# Patient Record
Sex: MALE | Race: WHITE
[De-identification: names, ages, dates, MRNs, and addresses within clinical notes are randomized per-mention and may not be internally consistent; named-entity substitution may affect disease eponyms.]

---

## 2017-06-23 ENCOUNTER — HOSPITAL ENCOUNTER (EMERGENCY)
Dept: HOSPITAL 80 - FED | Age: 67
Discharge: HOME | End: 2017-06-23
Payer: OTHER GOVERNMENT

## 2017-06-23 VITALS
TEMPERATURE: 97.9 F | HEART RATE: 75 BPM | OXYGEN SATURATION: 93 % | SYSTOLIC BLOOD PRESSURE: 125 MMHG | DIASTOLIC BLOOD PRESSURE: 80 MMHG | RESPIRATION RATE: 16 BRPM

## 2017-06-23 DIAGNOSIS — M54.9: Primary | ICD-10-CM

## 2017-06-23 DIAGNOSIS — Z86.73: ICD-10-CM

## 2017-06-23 DIAGNOSIS — F17.200: ICD-10-CM

## 2017-06-23 DIAGNOSIS — G89.29: ICD-10-CM

## 2017-06-23 NOTE — EDPHY
H & P


Stated Complaint: C/O chronic back pain, just D/C'ed from [facility] Seen by Provider: 06/23/17 13:20


HPI/ROS: 





CHIEF COMPLAINT: Chronic pain





HISTORY OF PRESENT ILLNESS: The patient is a 68 y/o male with a history of 

chronic pain complaining of ongoing back and right shoulder pain. He had spinal 

surgery in 1991 and two right shoulder operations, spinal stenosis, and 

arthritis that cause chronic pain. He was treating his pain with 10mg oxycodone 

and reports that he has been on methadone and has a history of alcohol abuse. 

He was recently discharged from the [facility] rehab facility for alcohol 

abuse. He reports he's been sober since then. He tried to set up an appointment 

at the Suboxone clinic, but said they were not taking new patients. He says he 

received 3 days of medications at discharge including Clonidine and Keppra, but 

he doesn't know what to do after his medications run out as he is unwilling to 

find a [facility] provider. He is having trouble sleeping. When I told him we do not 

prescribe narcotics for chronic pain, he states, "well, then this is a waste of 

time."





REVIEW OF SYSTEMS:


Constitutional: No fever, no chills


Eyes: No visual changes


ENT: No sore throat


Respiratory: No cough, no shortness of breath


Cardiac: No chest pain


Gastrointestinal: No nausea, no vomiting, no abdominal pain


Genitourinary: No hematuria, no dysuria


Musculoskeletal: No leg pain or swelling


Skin: No rash


Neurological: No headache, no numbness, no weakness


Psychiatric: No depression





- Personal History


Current Tetanus Diphtheria and Acellular Pertussis (TDAP): Yes





- Medical/Surgical History


PMH: 





PMH includes:


1. Chronic pain following spinal surgery in 1991 and right shoulder surgeries


2. PTSD


3. Depression


Hx Asthma: No


Hx Chronic Respiratory Disease: Yes


Hx Diabetes: No


Hx Cardiac Disease: No


Hx Renal Disease: No


Hx Cirrhosis: No


Hx Alcoholism: Yes


Hx HIV/AIDS: No


Hx Splenectomy or Spleen Trauma: No


Other PMH: ETOH and opiate abuse, chronic bacl and shoulder pain, PTSD, CVA, 

emphazema





- Social History


Smoking Status: Current every day smoker


Additional Social History: 





, uses [facility]. No current PCP. Recently discharged from [facility]





- Physical Exam


Exam: 





General Appearance: Alert, does not appear in time


Eyes: Pupils equal and round, no conjunctival pallor


ENT, Mouth: Mucous membranes moist


Neck: Normal inspection


Respiratory: Lungs are clear to auscultation


Cardiovascular: Regular rate and rhythm 


Gastrointestinal:  Abdomen is soft and non-tender 


Back: lumbar midline scar, no localized tenderness


Neurological:  A&O, nonfocal, normal gait


Skin:  Warm and dry


Extremities:  normal inspection


Psychiatric: Mood and affect normal


Constitutional: 


 Initial Vital Signs











Temperature (C)  36.6 C   06/23/17 13:09


 


Heart Rate  75   06/23/17 13:09


 


Respiratory Rate  16   06/23/17 13:09


 


Blood Pressure  125/80 H  06/23/17 13:09


 


O2 Sat (%)  93   06/23/17 13:09








 











O2 Delivery Mode               Room Air














Allergies/Adverse Reactions: 


 





No Known Allergies Allergy (Unverified 06/23/17 13:05)


 








Home Medications: 














 Medication  Instructions  Recorded


 


Buprenorphine HCl  06/23/17


 


Clonidine HCl  06/23/17


 


Famotidine  06/23/17


 


Levetiracetam ER  06/23/17


 


Melatonin  06/23/17


 


Pepcid  06/23/17


 


Zofran Odt  06/23/17














Medical Decision Making


ED Course/Re-evaluation: 





Discussed patient's situation with case management. They will work on trying to 

get patient into a Suboxone clinic. 





1408: Patient eloped from the ED by the time case management went to talk with 

him. 





Departure





- Departure


Disposition: Home, Routine, Self-Care


Clinical Impression: 


 Chronic pain





Condition: Good


Instructions:  Chronic Pain (ED)


Additional Instructions: 


Follow up with the resources provided by case management. 





Please be aware the Emergency Department does not prescribe narcotics for 

chronic pain or Suboxone for opiate dependency. 


Referrals: 


NONE *PRIMARY CARE P,. [Primary Care Provider] - As per Instructions


Report Scribed for: Trista Vaz


Report Scribed by: Jaqueline Calvillo


Date of Report: 06/23/17


Time of Report: 13:26


Physician Review and Approval Statement: 





06/23/17 13:26


Portions of this note were transcribed by a medical scribe.  I personally 

performed a history, physical exam, medical decision making, and confirmed 

accuracy of information the transcribed note.

## 2017-07-17 ENCOUNTER — HOSPITAL ENCOUNTER (EMERGENCY)
Dept: HOSPITAL 80 - FED | Age: 67
Discharge: HOME | End: 2017-07-17
Payer: OTHER GOVERNMENT

## 2017-07-17 VITALS
SYSTOLIC BLOOD PRESSURE: 135 MMHG | OXYGEN SATURATION: 92 % | DIASTOLIC BLOOD PRESSURE: 84 MMHG | RESPIRATION RATE: 16 BRPM | HEART RATE: 110 BPM

## 2017-07-17 VITALS — TEMPERATURE: 98.6 F

## 2017-07-17 DIAGNOSIS — F17.200: ICD-10-CM

## 2017-07-17 DIAGNOSIS — G89.29: ICD-10-CM

## 2017-07-17 DIAGNOSIS — Z86.73: ICD-10-CM

## 2017-07-17 DIAGNOSIS — F10.220: ICD-10-CM

## 2017-07-17 DIAGNOSIS — M54.5: Primary | ICD-10-CM

## 2017-07-17 NOTE — EDPHY
H & P


Stated Complaint: DETOX


HPI/ROS: 





CHIEF COMPLAINT:  Acute alcohol intoxication, back pain





HISTORY OF PRESENT ILLNESS:  Patient complains of acute on chronic alcohol use, 

chronic back pain.  He is primarily here because his friend was concerned about 

his level of alcohol intake.  He took him to a detox facility, but they sent 

him here for Librium.  He says that he drinks heavily, usually vodka.  Varying 

volume per day.  He has done this for many years.  He this time he does not 

want to.  He does want to avoid withdrawals.  He also complains of chronic back 

pain.  This is lumbar.  It has been present for years.  It has been worsening 

over the past 2 weeks.  He has occasional pain that radiates down the left leg.

  He has no saddle anesthesia.  No numbness or tingling.  No weakness of the 

lower extremities. No trauma or new injuries.  He has not seen a surgeon in 

many years for this.  No other associated complaints or modifying factors.





REVIEW OF SYSTEMS:


Ten systems reviewed and are negative unless otherwise noted in the HPI





PAST MEDICAL HISTORY:  Chronic back pain and alcohol abuse





SOCIAL HISTORY:  Chronic alcohol use.  [place].





FAMILY HISTORY:  Noncontributory





EXAMINATION


General Appearance:  Alert, no distress.  Normal conversation


Head: normocephalic, atraumatic


Eyes:  Pupils equal and round, no conjunctival pallor or injection


ENT, Mouth:  Mucous membranes moist


Neck:  Normal inspection, supple, non-tender


Respiratory:  Lungs are clear to auscultation. No wheezing, rhonchi or crackles


Cardiovascular:  Tachycardic rate.  Regular rhythm.  No murmur


Gastrointestinal:  Abdomen is soft and nontender


Back:  Tenderness of the lumbar musculature.  No bony tenderness, crepitus, step

-off or deformity.  There is a well-healed surgical Incision of the lumbar 

spine.  


Neurological:  GCS 15.  Cranial nerves 2-12 grossly intact. Patellar reflexes 

symmetric at 2+.  Strength of the ankles, knees and hips 5/5 in both 

extremities.  A&O, nonfocal, normal gait


Skin:  Warm and dry, no rash


Extremities:  Nontender, no pedal edema


Psychiatric:  Mood and affect normal





DIFFERENTIAL DIAGNOSES:


Including but not limited to chronic alcohol abuse, acute alcohol intoxication, 

chronic low back pain, lumbar radiculopathy, discopathy, muscular strain, 

chronic pain





MDM:


2:00 p.m.


Chronic back pain and chronic alcoholism.  The patient is here because he wants 

to go to the [facility] for detox.  The back pain does not reveal any evidence of 

acute cord compression or cauda equina.  He is fully neuro intact and 

ambulatory without assistance or signs of weakness.  Recommend follow-up with 

spinal surgeon and/or pain management specialist for ongoing pain management.  

He is comfortable with that plan.  He is discharged home stable condition with 

a Librium taper per protocol.





SUPERVISION: 


This patient was independently evaluated without direct examination by the 

attending physician. Case was discussed with attending physician.


Source: Patient


Exam Limitations: No limitations





- Personal History


Current Tetanus/Diphtheria Vaccine: Yes





- Medical/Surgical History


Hx Asthma: No


Hx Chronic Respiratory Disease: Yes


Hx Diabetes: No


Hx Cardiac Disease: No


Hx Renal Disease: No


Hx Cirrhosis: No


Hx Alcoholism: Yes


Hx HIV/AIDS: No


Hx Splenectomy or Spleen Trauma: No


Other PMH: ETOH and opiate abuse, chronic bacl and shoulder pain, PTSD, CVA, 

emphazema





- Social History


Smoking Status: Current every day smoker


Constitutional: 


 Initial Vital Signs











Temperature (C)  98.6 F   07/17/17 12:53


 


Heart Rate  126 H  07/17/17 12:53


 


Respiratory Rate  18   07/17/17 12:53


 


Blood Pressure  133/87 H  07/17/17 12:53


 


O2 Sat (%)  94   07/17/17 12:53








 











O2 Delivery Mode               Room Air














Allergies/Adverse Reactions: 


 





No Known Allergies Allergy (Unverified 06/23/17 13:05)


 








Home Medications: 














 Medication  Instructions  Recorded


 


Buprenorphine HCl  06/23/17


 


Clonidine HCl  06/23/17


 


Famotidine  06/23/17


 


Levetiracetam ER  06/23/17


 


Melatonin  06/23/17


 


Pepcid  06/23/17


 


Zofran Odt  06/23/17














Medical Decision Making





- Data Points


Medications Given: 


 








Discontinued Medications





Chlordiazepoxide (Librium 25 Mg Prepack#6)  1 btl TAKEHOME EDNOW ONE


   Stop: 07/17/17 14:08


   Last Admin: 07/17/17 14:18 Dose:  1 btl








Departure





- Departure


Disposition: Home, Routine, Self-Care


Clinical Impression: 


Alcohol dependence


Qualifiers:


 Substance use status: with intoxication Complication of substance-induced 

condition: uncomplicated Qualified Code(s): F10.220 - Alcohol dependence with 

intoxication, uncomplicated





Low back pain


Qualifiers:


 Chronicity: chronic Back pain laterality: midline Sciatica presence: without 

sciatica Qualified Code(s): M54.5 - Low back pain





Condition: Good


Instructions:  Chlordiazepoxide (By mouth), Low Back Strain (ED), Alcohol 

Intoxication (ED), Abuse of Alcohol (ED), Chronic Back Pain (ED)


Additional Instructions: 


1. Follow up with a [facility] for detox assistance


2. Follow up with primary care physician for further care


3. Follow up with Neurosurgery for ongoing management of the back pain


4. Return to ER for worsening pain, numbness, tingling, weakness, incontinence 

of bowel or bladder


Referrals: 


NONE *PRIMARY CARE P,. [Primary Care Provider] - As per Instructions


GAURANG Gloria MD [Medical Doctor] - As per Instructions


Redd Horvath MD [Medical Doctor] - As per Instructions

## 2017-09-16 ENCOUNTER — HOSPITAL ENCOUNTER (INPATIENT)
Dept: HOSPITAL 80 - FED | Age: 67
LOS: 8 days | Discharge: HOME | DRG: 897 | End: 2017-09-24
Attending: INTERNAL MEDICINE | Admitting: INTERNAL MEDICINE
Payer: OTHER GOVERNMENT

## 2017-09-16 DIAGNOSIS — D64.89: ICD-10-CM

## 2017-09-16 DIAGNOSIS — M54.9: ICD-10-CM

## 2017-09-16 DIAGNOSIS — F10.231: Primary | ICD-10-CM

## 2017-09-16 DIAGNOSIS — F43.12: ICD-10-CM

## 2017-09-16 DIAGNOSIS — E87.2: ICD-10-CM

## 2017-09-16 DIAGNOSIS — E83.42: ICD-10-CM

## 2017-09-16 DIAGNOSIS — G89.29: ICD-10-CM

## 2017-09-16 DIAGNOSIS — F17.210: ICD-10-CM

## 2017-09-16 DIAGNOSIS — J43.9: ICD-10-CM

## 2017-09-16 DIAGNOSIS — Z23: ICD-10-CM

## 2017-09-16 DIAGNOSIS — F10.221: ICD-10-CM
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
## 2017-09-16 DIAGNOSIS — M25.511: ICD-10-CM

## 2017-09-16 DIAGNOSIS — E83.30: ICD-10-CM

## 2017-09-16 DIAGNOSIS — K70.10: ICD-10-CM

## 2017-09-16 DIAGNOSIS — Z86.73: ICD-10-CM

## 2017-09-16 DIAGNOSIS — D69.59: ICD-10-CM

## 2017-09-16 DIAGNOSIS — E87.1: ICD-10-CM

## 2017-09-16 LAB
% IMMATURE GRANULYOCYTES: 0.3 % (ref 0–1.1)
ABSOLUTE IMMATURE GRANULOCYTES: 0.02 10^3/UL (ref 0–0.1)
ABSOLUTE NRBC COUNT: 0 10^3/UL (ref 0–0.01)
ADD DIFF?: NO
ADD MORPH?: NO
ADD SCAN?: NO
ANION GAP SERPL CALC-SCNC: 20 MEQ/L (ref 8–16)
ATYPICAL LYMPHOCYTE FLAG: 0 (ref 0–99)
CALCIUM SERPL-MCNC: 9.2 MG/DL (ref 8.5–10.4)
CHLORIDE SERPL-SCNC: 98 MEQ/L (ref 97–110)
CO2 SERPL-SCNC: 19 MEQ/L (ref 22–31)
CREAT SERPL-MCNC: 0.6 MG/DL (ref 0.7–1.3)
ERYTHROCYTE [DISTWIDTH] IN BLOOD BY AUTOMATED COUNT: 18 % (ref 11.5–15.2)
ETHANOL SERPL-MCNC: 76 MG/DL (ref 0–10)
FRAGMENT RBC FLAG: 0 (ref 0–99)
GFR SERPL CREATININE-BSD FRML MDRD: > 60 ML/MIN/{1.73_M2}
GLUCOSE SERPL-MCNC: 139 MG/DL (ref 70–100)
HCT VFR BLD CALC: 38.5 % (ref 40–51)
HGB BLD-MCNC: 13.3 G/DL (ref 13.7–17.5)
LEFT SHIFT FLG: 0 (ref 0–99)
LIPEMIA HEMOLYSIS FLAG: 90 (ref 0–99)
MAGNESIUM SERPL-MCNC: 1.6 MG/DL (ref 1.6–2.3)
MCH RBC BLDCO QN: 31.4 PG (ref 27.9–34.1)
MCHC RBC AUTO-ENTMCNC: 34.5 G/DL (ref 32.4–36.7)
MCV RBC AUTO: 91 FL (ref 81.5–99.8)
NRBC-AUTO%: 0 % (ref 0–0.2)
PLATELET # BLD: 135 10^3/UL (ref 150–400)
PLATELET CLUMPS FLAG: 0 (ref 0–99)
PMV BLD AUTO: 10.2 FL (ref 8.7–11.7)
POTASSIUM SERPL-SCNC: 4.3 MEQ/L (ref 3.5–5.2)
RBC # BLD AUTO: 4.23 10^6/UL (ref 4.4–6.38)
SODIUM SERPL-SCNC: 137 MEQ/L (ref 134–144)

## 2017-09-16 PROCEDURE — C1751 CATH, INF, PER/CENT/MIDLINE: HCPCS

## 2017-09-16 PROCEDURE — G0480 DRUG TEST DEF 1-7 CLASSES: HCPCS

## 2017-09-16 PROCEDURE — HZ2ZZZZ DETOXIFICATION SERVICES FOR SUBSTANCE ABUSE TREATMENT: ICD-10-PCS | Performed by: INTERNAL MEDICINE

## 2017-09-16 PROCEDURE — G0008 ADMIN INFLUENZA VIRUS VAC: HCPCS

## 2017-09-16 PROCEDURE — C1769 GUIDE WIRE: HCPCS

## 2017-09-16 NOTE — EDPHY
General





- History


Smoking Status: Current every day smoker


Narrative: 





CHIEF COMPLAINT: 


Alcohol intoxication, request for clearance to the [facility]





HISTORY OF PRESENT ILLNESS: 


Patient presents with request to go to detox for alcohol abuse.  He admits to 1 

L per day of clear liquor ingestion.  This is on a daily basis.  This has been 

for years.  He called 911 because he wants to get off of alcohol on detox.  

[facility], and he was sent here for 

medical clearance.  He has no chest pain.  He has no shortness of breath.  Has 

no thoughts of self-harm or harm towards others.  He is here voluntarily and 

wants to detox.  He has had alcohol within the past 12 hours.  He says that he 

is an early symptoms of withdrawal but that he is no where near delirium 

tremens as he has experienced this in the past.  He has no other associated 

complaints or modifying factors for this.  Does have ongoing right shoulder 

pain that is chronic and without any new injury or severity.





REVIEW OF SYSTEMS:


Ten systems reviewed and are negative unless otherwise noted in the HPI








PAST MEDICAL HISTORY: 


Right chronic shoulder pain.  Alcoholism





PAST SURGICAL HISTORY:


Reviewed





SOCIAL HISTORY:


Nonsmoker.  Retired from the  with [facility] at the [facility]





FAMILY HISTORY:


Noncontributory





EXAMINATION


General Appearance:  Alert, no distress


Head: normocephalic, atraumatic


Eyes:  Pupils equal and round, no conjunctival pallor or injection


ENT, Mouth:  Mucous membranes moist


Neck:  Normal inspection, supple, non-tender


Respiratory:  Lungs are clear to auscultation


Cardiovascular:  Regular rate and rhythm


Gastrointestinal:  Abdomen is soft and nontender


Back: non-tender, no bony abnormalities


Neurological:  GCS 15. Non tremulous.  A&O, nonfocal, normal gait.  Strength is 

symmetric in all 4 limbs.  No pronator drift.  No dysmetria.  No evidence of 

encephalopathy


Skin:  Warm and dry, no rash


Extremities:  Nontender, no pedal edema


Psychiatric:  Admits to chronic alcoholism.  Normal mood and affect.  No 

suicidal ideation.  No homicidal ideation.





DIFFERENTIAL DIAGNOSES:


Including but not limited to acute alcohol intoxication, DTs, withdrawals, 

chronic alcoholism








MDM:


7:02 p.m.


Acute alcohol intoxication the patient with chronic alcohol abuse.  He has no 

evidence of delirium tremens.  He does appear to be an early withdrawals.  He 

has no fever.  He is not encephalopathic.  He has had alcohol within the past 12

-14 hours.  He is voluntarily urine wants to go to the [facility].  Although some here 

with Librium incident with a Librium taper per protocol.  He is fully 

ambulatory without difficulty.  He is not slurring his words.  He is clinically 

sober and I do feel he is stable for discharge to the [facility].








8:40 p.m.


Notified by RN that the patient was attempted to ambulate.  This was 

unsuccessful.  He was reportedly unsteady.  I then examined him.  He was 

unsteady on his feet.  Secured he is in the room and obtained a breath alcohol 

test.  This registered at 0.073.  I have ordered IV fluid , laboratory studies, 

CT head and I will monitor closely.  Patient already had thiamine and folate 

when he arrived by p.o. intake.





9:55 p.m.


Notified by radiologist Dr. Valdovinos.  No acute findings on the CT scan of the 

head.  Does have appearance consistent with clinical history of chronic alcohol 

abuse





10:10 p.m.


Patient re-evaluated.  Patient's symptoms do well with the Ativan began to 

return.  He does appear to be an early delirium tremens or late alcohol 

withdrawals.  No delirium yet.  No encephalopathy.  I will admit him for 

treatment and prevention of DTs.





10:20 p.m.


Case discussed with Dr. Pam Walker.  She will admit the patient to her 

service.  She recommends Valium for further treatment.  Admit to step-down 

unit.  He is admitted in stable condition.


 (Christian Lacy)


Medical Decision Making: 





I have evaluated and participated in the management of this patient.  My co-

signature indicates that I have reviewed this chart and that I agree with the 

findings and the plan of care as documented.  My personal history and physical 

findings include:  This is a 67-year-old male with a long history of alcohol 

abuse, drinking a L of vodka daily.  He has not had anything to drink for 

several hours now and is beginning to experience symptoms of withdrawal.  He 

was referred from the [facility] because of a high CIWA score.  The time of my 

evaluation he is hypertensive, tachycardic, and has a mild tremor.  He is not 

confused.  Normal extraocular movements.  He is quite unsteady on his feet, 

essentially unable to walk on his own.  He does have a history of stroke with a 

foot drop but is ataxic at the time of my evaluation.  I recommend admission 

for treatment of alcohol withdrawal.  He has received thiamine.  He has been 

given Ativan. (Annette Rios)





- Objective


Vital Signs: 


 Initial Vital Signs











Temperature (C)  37.4 C   09/16/17 18:22


 


Heart Rate  124 H  09/16/17 18:22


 


Respiratory Rate  20   09/16/17 18:22


 


Blood Pressure  133/104 H  09/16/17 18:22


 


O2 Sat (%)  92   09/16/17 18:22








 











O2 Delivery Mode               Nasal Cannula


 


O2 (L/minute)                  2














Allergies/Adverse Reactions: 


 





No Known Allergies Allergy (Verified 09/16/17 18:22)


 








Home Medications: 














 Medication  Instructions  Recorded


 


Methadone HCl [Methadone 5 mg (*)] 5 mg PO BID 09/17/17


 


oxyCODONE IR [Oxycodone Ir (*)] 5 mg PO BID 09/17/17











Laboratory Results: 


 Laboratory Results





 09/16/17 21:21 





 09/16/17 21:21 








Medications Given: 


 





Enoxaparin Sodium (Lovenox)  40 mg SC DAILY MELISSA


   Stop: 03/16/18 08:59


   Last Admin: 09/17/17 09:49 Dose:  40 mg


Folic Acid (Folic Acid)  1 mg PO DAILY MELISSA


   Stop: 03/16/18 08:59


   Last Admin: 09/17/17 09:49 Dose:  1 mg


Sodium Chloride (Ns)  1,000 mls @ 100 mls/hr IV CONT MELISSA


   Stop: 03/15/18 22:44


   Last Admin: 09/17/17 11:26 Dose:  1,000 mls


Dexmedetomidine HCl 400 mcg/ (Sodium Chloride)  104 mls @ 0 mls/hr IV CONT MELISSA; 

Titrate


   PRN Reason: Protocol


   Stop: 03/16/18 13:29


   Last Admin: 09/17/17 19:30 Dose:  104 mls


Lorazepam (Ativan Injection)  2 mg IVP Q6 MELISSA


   Stop: 03/16/18 17:59


   Last Admin: 09/17/17 21:47 Dose:  2 mg


Methadone HCl (Methadone Hcl)  5 mg PO BID MELISSA


   Stop: 09/27/17 20:59


   Last Admin: 09/17/17 21:47 Dose:  5 mg


Multivitamins (Tab-A-Leora)  1 each PO DAILY MELISSA


   Stop: 03/16/18 08:59


   Last Admin: 09/17/17 09:49 Dose:  1 each


Oxycodone HCl (Oxycodone Ir)  5 mg PO BID MELISSA


   Stop: 09/27/17 20:59


   Last Admin: 09/17/17 21:47 Dose:  5 mg


Thiamine HCl (Vitamin B-1)  100 mg PO DAILY MELISSA


   Stop: 03/16/18 08:59


   Last Admin: 09/17/17 09:49 Dose:  100 mg





Discontinued Medications





Chlordiazepoxide (Librium 25 Mg Prepack#6)  1 btl TAKEHOME EDNOW ONE


   Stop: 09/16/17 19:02


   Last Admin: 09/16/17 19:44 Dose:  1 btl


Chlordiazepoxide HCl (Librium)  25 mg PO EDNOW ONE


   Stop: 09/16/17 19:02


   Last Admin: 09/16/17 19:43 Dose:  25 mg


Folic Acid (Folic Acid)  1 mg PO EDNOW ONE


   Stop: 09/16/17 19:03


   Last Admin: 09/16/17 19:44 Dose:  1 mg


Sodium Chloride (Ns)  1,000 mls @ 0 mls/hr IV EDNOW ONE; Wide Open


   PRN Reason: Protocol


   Stop: 09/16/17 20:46


   Last Admin: 09/16/17 21:29 Dose:  1,000 mls


Potassium Phosphate 10 mmol/ (Dextrose)  253.3333 mls @ 42.222 mls/hr IV ONCE@

12 ONE


   Stop: 09/17/17 06:29


   Last Admin: 09/17/17 02:08 Dose:  253.3333 mls


Magnesium Sulfate/Dextrose (Magnesium Sulf 1 Gm (Premix))  100 mls @ 100 mls/hr 

IV ONCE ONE


   Stop: 09/17/17 01:31


   Last Admin: 09/17/17 00:53 Dose:  100 mls


Influenza Virus Vaccine Quadrival (Fluarix Quad [id number])  0.5 ml IM .ONCE ONE


   Stop: 09/17/17 01:09


   Last Admin: 09/17/17 01:20 Dose:  0.5 ml


Lorazepam (Ativan Injection)  1 mg IVP EDNOW ONE


   Stop: 09/16/17 21:10


   Last Admin: 09/16/17 21:27 Dose:  1 mg


Lorazepam (Ativan Injection)  1 mg IVP EDNOW ONE


   Stop: 09/16/17 21:47


   Last Admin: 09/16/17 21:48 Dose:  1 mg


Lorazepam (Ativan Injection)  1 mg IVP EDNOW ONE


   Stop: 09/16/17 22:19


   Last Admin: 09/16/17 23:04 Dose:  1 mg


Lorazepam (Ativan Injection)  0.5 - 3 mg IVP Q30M PRN


   PRN Reason: CIWA-Ar score greater than 15


   Stop: 03/15/18 22:30


   Last Admin: 09/17/17 12:34 Dose:  3 mg


Lorazepam (Ativan Injection)  0.5 - 3 mg IVP ONCE ONE


   Stop: 09/17/17 13:31


   Last Admin: 09/17/17 13:39 Dose:  Not Given


Thiamine HCl (Vitamin B-1)  100 mg PO EDNOW ONE


   Stop: 09/16/17 19:03


   Last Admin: 09/16/17 19:44 Dose:  100 mg








Departure





- Departure


Disposition: [facility]


Clinical Impression: 


 Alcoholism





Acute alcohol intoxication


Qualifiers:


 Complication of substance-induced condition: uncomplicated Qualified Code(s): 

F10.929 - Alcohol use, unspecified with intoxication, unspecified





Alcohol withdrawal


Qualifiers:


 Complication of substance-induced condition: with unspecified complication 

Qualified Code(s): F10.239 - Alcohol dependence with withdrawal, unspecified





Condition: Good

## 2017-09-17 LAB
% IMMATURE GRANULYOCYTES: 0.4 % (ref 0–1.1)
ABSOLUTE IMMATURE GRANULOCYTES: 0.02 10^3/UL (ref 0–0.1)
ABSOLUTE NRBC COUNT: 0 10^3/UL (ref 0–0.01)
ADD DIFF?: NO
ADD MORPH?: NO
ADD SCAN?: NO
ALBUMIN SERPL-MCNC: 4.1 G/DL (ref 3.5–5)
ALP SERPL-CCNC: 93 IU/L (ref 38–126)
ALT SERPL-CCNC: 65 IU/L (ref 21–72)
ANION GAP SERPL CALC-SCNC: 10 MEQ/L (ref 8–16)
AST SERPL-CCNC: 109 IU/L (ref 17–59)
ATYPICAL LYMPHOCYTE FLAG: 10 (ref 0–99)
BILIRUB SERPL-MCNC: 2.4 MG/DL (ref 0.1–1.4)
BILIRUBIN-CONJUGATED: 0.8 MG/DL (ref 0–0.5)
BILIRUBIN-UNCONJUGATED: 1.6 MG/DL (ref 0–1.1)
CALCIUM SERPL-MCNC: 9 MG/DL (ref 8.5–10.4)
CHLORIDE SERPL-SCNC: 98 MEQ/L (ref 97–110)
CO2 SERPL-SCNC: 26 MEQ/L (ref 22–31)
CREAT SERPL-MCNC: 0.5 MG/DL (ref 0.7–1.3)
ERYTHROCYTE [DISTWIDTH] IN BLOOD BY AUTOMATED COUNT: 18.2 % (ref 11.5–15.2)
FRAGMENT RBC FLAG: 0 (ref 0–99)
GFR SERPL CREATININE-BSD FRML MDRD: > 60 ML/MIN/{1.73_M2}
GLUCOSE SERPL-MCNC: 106 MG/DL (ref 70–100)
HCT VFR BLD CALC: 39 % (ref 40–51)
HGB BLD-MCNC: 13.4 G/DL (ref 13.7–17.5)
LEFT SHIFT FLG: 0 (ref 0–99)
LIPEMIA HEMOLYSIS FLAG: 90 (ref 0–99)
MAGNESIUM SERPL-MCNC: 1.9 MG/DL (ref 1.6–2.3)
MCH RBC BLDCO QN: 31.6 PG (ref 27.9–34.1)
MCHC RBC AUTO-ENTMCNC: 34.4 G/DL (ref 32.4–36.7)
MCV RBC AUTO: 92 FL (ref 81.5–99.8)
NRBC-AUTO%: 0 % (ref 0–0.2)
PLATELET # BLD: 101 10^3/UL (ref 150–400)
PLATELET CLUMPS FLAG: 0 (ref 0–99)
PMV BLD AUTO: 10 FL (ref 8.7–11.7)
POTASSIUM SERPL-SCNC: 3.9 MEQ/L (ref 3.5–5.2)
PROT SERPL-MCNC: 7.2 G/DL (ref 6.3–8.2)
RBC # BLD AUTO: 4.24 10^6/UL (ref 4.4–6.38)
SODIUM SERPL-SCNC: 134 MEQ/L (ref 134–144)

## 2017-09-17 RX ADMIN — THERA TABS SCH EACH: TAB at 09:49

## 2017-09-17 RX ADMIN — DEXMEDETOMIDINE HYDROCHLORIDE SCH MLS: 100 INJECTION, SOLUTION, CONCENTRATE INTRAVENOUS at 13:39

## 2017-09-17 RX ADMIN — DEXMEDETOMIDINE HYDROCHLORIDE SCH MLS: 100 INJECTION, SOLUTION, CONCENTRATE INTRAVENOUS at 19:30

## 2017-09-17 RX ADMIN — OXYCODONE HYDROCHLORIDE SCH MG: 15 TABLET ORAL at 21:47

## 2017-09-17 RX ADMIN — METHADONE HYDROCHLORIDE SCH MG: 5 TABLET ORAL at 21:47

## 2017-09-17 RX ADMIN — ENOXAPARIN SODIUM SCH MG: 100 INJECTION SUBCUTANEOUS at 09:49

## 2017-09-17 RX ADMIN — SODIUM CHLORIDE SCH MLS: 900 INJECTION, SOLUTION INTRAVENOUS at 00:05

## 2017-09-17 RX ADMIN — Medication SCH MG: at 09:49

## 2017-09-17 RX ADMIN — SODIUM CHLORIDE SCH MLS: 900 INJECTION, SOLUTION INTRAVENOUS at 11:26

## 2017-09-17 RX ADMIN — FOLIC ACID SCH MG: 1 TABLET ORAL at 09:49

## 2017-09-17 NOTE — GHP
[f 
rep st]



                                                            HISTORY AND PHYSICAL





DATE OF ADMISSION:  09/16/2017



CHIEF COMPLAINT:  Alcohol withdrawal.



HISTORY OF PRESENT ILLNESS:  A 67-year-old male with history of alcohol abuse, 
withdrawal with seizure, presenting with request for detox.  He admits to 
drinking a L a day of Absolute vodka and does this daily.  He has been drinking 
this much for years.  He states he has [facility] through [facility] in 
the past.  [facility] 
complaining of shakes, nausea, hot flashes, and a dry cough that is unchanged.  
No nausea, vomiting.  No dysuria.



REVIEW OF SYSTEMS:  I completed a 10-point review of systems, negative except 
as noted in History of Present Illness.



PAST MEDICAL HISTORY:  Right shoulder pain.  

Chronic back pain.  Alcohol abuse with withdrawal and seizure, PTSD, CVA, 
emphysema.



SURGICAL HISTORY:  Back and shoulder surgery.



ALLERGIES:  No known drug allergies.



MEDICATIONS:  None.



SOCIAL HISTORY:  [place]ives here in town.  Has a daughter here in town.  Drinks a L 
of vodka daily, half a pack to a pack a day of cigarettes for 40 years.



ALLERGIES:  No known drug allergies.



HOME MEDICATIONS:  Zofran, Pepcid, melatonin, Keppra, famotidine, clonidine



PHYSICAL EXAM:  VITAL SIGNS:  Temperature 36.9, blood pressure 149/89, heart 
rate 1/teens, respirations 16, 95% on 2 L.  GENERAL:  Disheveled, lying in bed.
  HEENT:  PERRLA.  EOMI.  Poor dentition.  CV:  Tachy but regular.  No murmurs, 
gallops, rubs.  LUNGS:  Clear.  No crackles or wheezing.  ABDOMEN:  Soft.  Mild 
tenderness diffusely but no rebound or guarding.  Positive bowel sounds.  :  
No suprapubic tenderness.  MUSCULOSKELETAL:  5/5 upper and lower extremity 
strength.  

NEURO:  2 through 12 intact.  Positive tremors.  Positive tongue 
fasciculations.  Alert and oriented x3.



LABS:  WBC 7, hemoglobin is 13, hematocrit 38, platelets 135.  Sodium 137, 
potassium 4.3, chloride 98.  Anion gap is 20.  BUN is 12, creatinine 0.6.  
Glucose 139, phos 1.8, mag 1.6.  



Head CT:  Underlying cerebellar and cerebral atrophy without acute intracranial 
abnormality.



ASSESSMENT/PLAN:  

1.  Alcohol withdrawal:  The patient will be transferred to the ICU and placed 
on CIWA.  Multivitamins, folate, thiamine.

2.  Hypophosphatemia.  Replete.

3.  Alcohol abuse.  The patient wants to quit, although he has had several 
hospitalizations in the past.  Will have Case Management evaluate.

4.  Anion gap acidosis.  Suspect starvation and alcohol ketoacidosis.  Check a 
lactate, repeat in the morning and will hydrate.

5.  Alcohol abuse.  Alcohol level is high at 76.

6.  Hypophosphatemia, replete and place on protocol.

7.  Normocytic anemia, likely secondary to alcohol.

8.  Thrombocytopenia, again secondary to alcohol.  No active bleeding.

9.  Chronic pain.  Resume home medications.  Questionable seizure disorder? Has 
Keppra on med list.  Will have to after review with patient.  Will resume these 
medications.

10.  Diet:  Regular.

11.  Deep venous thrombosis prophylaxis:  Lovenox.



DISPOSITION:  Patient warrants inpatient admission given acute alcohol 
withdrawal, warranting IV Ativan and ICU monitoring.





Job #:  [id number]

MTDD

## 2017-09-17 NOTE — HOSPPROG
Hospitalist Progress Note


Assessment/Plan: 





# etOH abuse and w/d - concerned this will be severe


   - cont ativan per CIWA


   - thiamine/folate


# thrombocytopenia - check LFTs


# hx seizures - he says all etOH related; he is on keppra - will continue


# possible chronic pain - reconcile meds when available





Subjective: received ativan IV overnight; currently sleeping, drowsy


Objective: 


 Vital Signs











Temp Pulse Resp BP Pulse Ox


 


 37.1 C   95   24 H  136/86 H  94 


 


 09/17/17 08:00  09/17/17 10:00  09/17/17 10:00  09/17/17 10:00  09/17/17 10:00








 Laboratory Results





 09/17/17 05:00 





 09/17/17 05:00 





 











 09/16/17 09/17/17 09/18/17





 05:59 05:59 05:59


 


Intake Total  1978 


 


Output Total  1800 100


 


Balance  178 -100








chart reviewed


CT head reviewed





- Physical Exam


Constitutional: unkempt


Cardiovascular: regular rate and rhythym, no murmur, rub, or gallop


Respiratory: no respiratory distress, no rales or rhonchi, clear to auscultation


Gastrointestinal: normoactive bowel sounds, soft, non-tender abdomen, no 

palpable masses





ICD10 Worksheet


Patient Problems: 


 Problems











Problem Status Onset


 


Acute alcohol intoxication Acute  


 


Alcoholism Acute  


 


Alcohol withdrawal Acute

## 2017-09-17 NOTE — ASMTCMCOM
CM Note

 

CM Note                       

Notes:

68 yo male came to [facility] from the [facility], wants to detox and stop drinking. [facility] 

[facility]. Patient is unsteady and in withdrawal, but did ask for help with [facility] 

Directives. Case Management to follow with ETOH resources.

 

Date Signed:  09/17/2017 04:22 PM

Electronically Signed By:Sapphire Hall LCSW

## 2017-09-17 NOTE — GCON
[f rep st]



                                                                    CONSULTATION





PULMONARY/CRITICAL CARE CONSULTATION



DATE OF CONSULTATION:  09/17/2017



REFERRING PHYSICIAN:  Hua Hayes MD



REASON FOR REFERRAL:  Evaluation and management of agitation, alcohol withdrawal, and elevated biliru
bin.



HISTORY:  The patient is a 67-year-old male with a long history of alcohol abuse, who has attempted t
o stop drinking in the past.  He drinks about a liter of vodka a day for years.  He was brought to [facility] yesterday complaining of shakes, nausea, and hot flashes.  He was rowan
sferred here because of high CIWA score.  He has been intermittently sedated and agitated, throwing t
hings at the nurses.  He has just been started on Precedex and is now much more sedated.



PAST MEDICAL HISTORY:  

1.  Shoulder pain.

2.  Chronic back pain.  

3.  Alcohol abuse.

4.  PTSD.

5.  History of a CVA.

6.  Emphysema.



MEDICATIONS:  None.



ALLERGIES:  None.



SOCIAL HISTORY:  The patient lives in [place].  He drinks a liter of vodka daily and smokes half a pa
ck of cigarettes daily.



FAMILY HISTORY:  Unremarkable.



REVIEW OF SYSTEMS:  Unobtainable.



PHYSICAL EXAMINATION:  GENERAL:  The patient is sedated.  He is minimally responsive to noxious stimu
li.  VITAL SIGNS:  His blood pressure is 128/92, with a heart rate of 64.  He is afebrile.  Oxygen sa
turations are 99% on 2 L.  HEENT:  Normocephalic and atraumatic.  No icterus.  NECK:  No JVD.  Trache
a is midline.  CHEST:  Clear to auscultation.  CARDIAC:  Regular rate and rhythm without murmur.  ABD
OMEN:  Soft, nontender.  Bowel sounds are present.  EXTREMITIES:  No clubbing, cyanosis, or edema.  N
EURO:  The patient moves all extremities weakly to noxious stimuli.  Pupils are responsive and the ga
ze is conjugate.



LABORATORY DATA:  Chemistry group is remarkable for a total bilirubin of 2.4 with an unconjugated buck
irubin of 1.6.  Hemoglobin is 13.4, with an MCV of 92.  Venous lactate is 1.6.  Alcohol level of 76 a
t the time of admission.  A CT scan of the head shows atrophy with no acute findings.  Images reviewe
d.



ASSESSMENT:  

1.  Alcohol intoxication/withdrawal.  The patient has been showing signs of withdrawal while still ha
ving a measurable alcohol level, which is somewhat concerning for high risk of severe withdrawal and 
seizures.  He was having breakthrough symptoms of confusion and agitation despite intravenous Ativan.
  Precedex has been added and he is much more sedated.  

2.  Elevated bilirubin.  This could be due to cirrhosis.  The AST is mildly elevated, although the AL
T is normal.  It also could be Gilbert's.



RECOMMENDATIONS:  

1.  Reduce Precedex dose.  Continue benzodiazepines p.r.n. in addition to scheduled Ativan, which has
 now been started.  

2.  Follow liver function tests.





Job #:  [id number]

## 2017-09-18 LAB
% IMMATURE GRANULYOCYTES: 0.5 % (ref 0–1.1)
ABSOLUTE IMMATURE GRANULOCYTES: 0.03 10^3/UL (ref 0–0.1)
ABSOLUTE NRBC COUNT: 0 10^3/UL (ref 0–0.01)
ADD DIFF?: NO
ADD MORPH?: NO
ADD SCAN?: NO
ANION GAP SERPL CALC-SCNC: 14 MEQ/L (ref 8–16)
ATYPICAL LYMPHOCYTE FLAG: 10 (ref 0–99)
CALCIUM SERPL-MCNC: 9.4 MG/DL (ref 8.5–10.4)
CHLORIDE SERPL-SCNC: 99 MEQ/L (ref 97–110)
CO2 SERPL-SCNC: 22 MEQ/L (ref 22–31)
CREAT SERPL-MCNC: 0.5 MG/DL (ref 0.7–1.3)
ERYTHROCYTE [DISTWIDTH] IN BLOOD BY AUTOMATED COUNT: 17.3 % (ref 11.5–15.2)
FRAGMENT RBC FLAG: 0 (ref 0–99)
GFR SERPL CREATININE-BSD FRML MDRD: > 60 ML/MIN/{1.73_M2}
GLUCOSE SERPL-MCNC: 104 MG/DL (ref 70–100)
HCT VFR BLD CALC: 43.2 % (ref 40–51)
HGB BLD-MCNC: 14.8 G/DL (ref 13.7–17.5)
LEFT SHIFT FLG: 0 (ref 0–99)
LIPEMIA HEMOLYSIS FLAG: 90 (ref 0–99)
MAGNESIUM SERPL-MCNC: 1.5 MG/DL (ref 1.6–2.3)
MCH RBC BLDCO QN: 31.7 PG (ref 27.9–34.1)
MCHC RBC AUTO-ENTMCNC: 34.3 G/DL (ref 32.4–36.7)
MCV RBC AUTO: 92.5 FL (ref 81.5–99.8)
NRBC-AUTO%: 0 % (ref 0–0.2)
PLATELET # BLD: 92 10^3/UL (ref 150–400)
PLATELET CLUMPS FLAG: 0 (ref 0–99)
PMV BLD AUTO: 10.4 FL (ref 8.7–11.7)
POTASSIUM SERPL-SCNC: 3.7 MEQ/L (ref 3.5–5.2)
RBC # BLD AUTO: 4.67 10^6/UL (ref 4.4–6.38)
SODIUM SERPL-SCNC: 135 MEQ/L (ref 134–144)

## 2017-09-18 RX ADMIN — Medication SCH: at 14:07

## 2017-09-18 RX ADMIN — SODIUM CHLORIDE SCH MLS: 900 INJECTION, SOLUTION INTRAVENOUS at 21:23

## 2017-09-18 RX ADMIN — OXYCODONE HYDROCHLORIDE SCH: 15 TABLET ORAL at 21:20

## 2017-09-18 RX ADMIN — POTASSIUM CHLORIDE SCH MLS: 10 INJECTION, SOLUTION INTRAVENOUS at 21:18

## 2017-09-18 RX ADMIN — ENOXAPARIN SODIUM SCH: 100 INJECTION SUBCUTANEOUS at 14:06

## 2017-09-18 RX ADMIN — FAMOTIDINE SCH: 20 TABLET, FILM COATED ORAL at 21:21

## 2017-09-18 RX ADMIN — DEXMEDETOMIDINE HYDROCHLORIDE SCH MLS: 100 INJECTION, SOLUTION, CONCENTRATE INTRAVENOUS at 00:09

## 2017-09-18 RX ADMIN — SODIUM CHLORIDE SCH MLS: 900 INJECTION, SOLUTION INTRAVENOUS at 00:11

## 2017-09-18 RX ADMIN — METHADONE HYDROCHLORIDE SCH: 5 TABLET ORAL at 21:21

## 2017-09-18 RX ADMIN — METHADONE HYDROCHLORIDE SCH: 5 TABLET ORAL at 14:06

## 2017-09-18 RX ADMIN — OXYCODONE HYDROCHLORIDE SCH: 15 TABLET ORAL at 14:06

## 2017-09-18 RX ADMIN — FOLIC ACID SCH: 1 TABLET ORAL at 14:06

## 2017-09-18 RX ADMIN — THERA TABS SCH: TAB at 14:06

## 2017-09-18 RX ADMIN — DEXMEDETOMIDINE HYDROCHLORIDE SCH MLS: 100 INJECTION, SOLUTION, CONCENTRATE INTRAVENOUS at 20:40

## 2017-09-18 RX ADMIN — DEXMEDETOMIDINE HYDROCHLORIDE SCH MLS: 100 INJECTION, SOLUTION, CONCENTRATE INTRAVENOUS at 09:42

## 2017-09-18 RX ADMIN — DEXMEDETOMIDINE HYDROCHLORIDE SCH MLS: 100 INJECTION, SOLUTION, CONCENTRATE INTRAVENOUS at 16:57

## 2017-09-18 NOTE — HOSPPROG
Hospitalist Progress Note


Assessment/Plan: 





66 yo M with PMH of etoh abuse and w/d as well as prior w/d seizures presenting 

with etoh w/d





# etoh w/d: severe, requiring precedex gtt to manage agitation and aggression. 

Continue precedex and scheduled ativan along with mvi/thiamine/folate. Will 

need longer term treatment plan when acute detox resolved if he is amenable. 


# etoh abuse: as above, longstanding issue, he is eager to quit and may [facility] 

[facility]


# AGMA: secondary to alcoholic ketoacidosis and resolved with IVF


# alcoholic hepatitis: mild with only mildly increased bili and ast


# hypophosphatemia/hypomagnesemia: repleting per electrolyte protocol, improving


# anemia/thrombocytopenia: likely related to bone marrow suppression from heavy 

etoh abuse, trending


# dispo: IP status, remains in ICU on precedex gtt, high risk given need for 

precedex drip


Patient new to my care. Old records reviewed and summarized as above. 





Subjective: patient still requiring precedex, agitated and agressive when not 

sedated


Objective: 


 Vital Signs











Temp Pulse Resp BP Pulse Ox


 


 36.9 C   58 L  20   143/82 H  96 


 


 09/18/17 07:57  09/18/17 10:00  09/18/17 10:00  09/18/17 10:00  09/18/17 10:00








 Laboratory Results





 09/18/17 10:20 





 09/18/17 10:20 





 











 09/17/17 09/18/17 09/19/17





 05:59 05:59 05:59


 


Intake Total 4245 2910 


 


Output Total 1800 1605 900


 


Balance 178 1305 -900








alternating between somnolent and agitated/aggressive 


rrr no mrg


cta to ant exam


soft nt nd


no cce


aggressive/agitated





ICD10 Worksheet


Patient Problems: 


 Problems











Problem Status Onset


 


Acute alcohol intoxication Acute  


 


Alcohol withdrawal Acute  


 


Alcoholism Acute

## 2017-09-18 NOTE — PDINTPN
Intensivist Progress Note


Assessment/Plan: 


Assessment:





Chronic alcohol abuse 





Alcohol withdrawal, DTs.  On the CIWA protocol.  On Precedex as well as 

scheduled and as needed Ativan.





History of tobacco tobacco abuse.  Chronic bronchitis





Metabolic:  No issues identified





DVT prophylaxis:  On enoxaparin.





GI prophylaxis:  Will add Pepcid orally.








Plan:  Continue care in the intensive care unit.  Continue Ativan scheduled and 

p.r.n..  Continue thiamin and CIWA protocol.  Decreased Precedex as possible.  

Add a nicotine patch.  Follow laboratory.  Continue intravenous fluids.  See 

orders.








Subjective: 





Sedated, arouses, can be combative/confused at times.


Objective: 





 Vital Signs











Temp Pulse Resp BP Pulse Ox


 


 36.9 C   58 L  20   143/82 H  96 


 


 09/18/17 07:57  09/18/17 10:00  09/18/17 10:00  09/18/17 10:00  09/18/17 10:00








 Laboratory Results





 09/18/17 10:20 





 09/18/17 10:20 





 











 09/17/17 09/18/17 09/19/17





 05:59 05:59 05:59


 


Intake Total 1978 2910 


 


Output Total 1800 1605 900


 


Balance 178 1305 -900











 Laboratory Tests











  09/17/17 09/17/17 09/18/17





  01:05 12:35 10:20


 


VBG Lactic Acid  1.9  


 


Magnesium    1.5 L


 


Total Bilirubin   2.4 H 


 


Conjugated Bilirubin   0.8 H 


 


AST   109 H 


 


ALT   65 


 


Albumin   4.1 














Physical Exam





- Physical Exam


General Appearance: other (Sedated, arousable, responds.)


EENT: PERRL/EOMI, other (On room air to 2 L)


Neck: normal inspection (No ovious JVD)


Respiratory: decreased breath sounds (Bilaterally), rales (Few nonspecific at 

bases), rhonchi (Few with cough), wheezing (Occasional comma secondary to mucus)


Cardiac/Chest: regular rate, rhythm


Abdomen: normal bowel sounds, non-tender, soft, No organomegaly


Skin: normal color


Extremities: No pedal edema


Neuro/Psych: no motor/sensory deficits (Moves all extremities equally), 

cognition abnormalities (Lethargic, arousable, sedated)





ICD10 Worksheet


Patient Problems: 


 Problems











Problem Status Onset


 


Acute alcohol intoxication Acute  


 


Alcoholism Acute  


 


Alcohol withdrawal Acute

## 2017-09-19 LAB
% IMMATURE GRANULYOCYTES: 0.4 % (ref 0–1.1)
ABSOLUTE IMMATURE GRANULOCYTES: 0.03 10^3/UL (ref 0–0.1)
ABSOLUTE NRBC COUNT: 0 10^3/UL (ref 0–0.01)
ADD DIFF?: NO
ADD MORPH?: NO
ADD SCAN?: NO
ANION GAP SERPL CALC-SCNC: 13 MEQ/L (ref 8–16)
ATYPICAL LYMPHOCYTE FLAG: 0 (ref 0–99)
CALCIUM SERPL-MCNC: 9.2 MG/DL (ref 8.5–10.4)
CHLORIDE SERPL-SCNC: 100 MEQ/L (ref 97–110)
CO2 SERPL-SCNC: 20 MEQ/L (ref 22–31)
CREAT SERPL-MCNC: 0.5 MG/DL (ref 0.7–1.3)
ERYTHROCYTE [DISTWIDTH] IN BLOOD BY AUTOMATED COUNT: 17.7 % (ref 11.5–15.2)
FRAGMENT RBC FLAG: 0 (ref 0–99)
GFR SERPL CREATININE-BSD FRML MDRD: > 60 ML/MIN/{1.73_M2}
GLUCOSE SERPL-MCNC: 104 MG/DL (ref 70–100)
HCT VFR BLD CALC: 43.6 % (ref 40–51)
HGB BLD-MCNC: 14.6 G/DL (ref 13.7–17.5)
LEFT SHIFT FLG: 10 (ref 0–99)
LIPEMIA HEMOLYSIS FLAG: 80 (ref 0–99)
MAGNESIUM SERPL-MCNC: 1.5 MG/DL (ref 1.6–2.3)
MCH RBC BLDCO QN: 31.3 PG (ref 27.9–34.1)
MCHC RBC AUTO-ENTMCNC: 33.5 G/DL (ref 32.4–36.7)
MCV RBC AUTO: 93.4 FL (ref 81.5–99.8)
NRBC-AUTO%: 0 % (ref 0–0.2)
PLATELET # BLD: 92 10^3/UL (ref 150–400)
PLATELET CLUMPS FLAG: 0 (ref 0–99)
PMV BLD AUTO: 10.9 FL (ref 8.7–11.7)
POTASSIUM SERPL-SCNC: 3.8 MEQ/L (ref 3.5–5.2)
POTASSIUM SERPL-SCNC: 4.1 MEQ/L (ref 3.5–5.2)
RBC # BLD AUTO: 4.67 10^6/UL (ref 4.4–6.38)
SODIUM SERPL-SCNC: 133 MEQ/L (ref 134–144)

## 2017-09-19 PROCEDURE — 02HV33Z INSERTION OF INFUSION DEVICE INTO SUPERIOR VENA CAVA, PERCUTANEOUS APPROACH: ICD-10-PCS | Performed by: RADIOLOGY

## 2017-09-19 RX ADMIN — METHADONE HYDROCHLORIDE SCH MG: 5 TABLET ORAL at 01:05

## 2017-09-19 RX ADMIN — OXYCODONE HYDROCHLORIDE SCH MG: 15 TABLET ORAL at 21:25

## 2017-09-19 RX ADMIN — METHADONE HYDROCHLORIDE SCH MG: 5 TABLET ORAL at 21:25

## 2017-09-19 RX ADMIN — NICOTINE SCH MG: 21 PATCH, EXTENDED RELEASE TOPICAL at 07:58

## 2017-09-19 RX ADMIN — POTASSIUM CHLORIDE SCH MLS: 10 INJECTION, SOLUTION INTRAVENOUS at 00:15

## 2017-09-19 RX ADMIN — FAMOTIDINE SCH MG: 20 TABLET, FILM COATED ORAL at 08:01

## 2017-09-19 RX ADMIN — DEXMEDETOMIDINE HYDROCHLORIDE SCH MLS: 100 INJECTION, SOLUTION, CONCENTRATE INTRAVENOUS at 03:29

## 2017-09-19 RX ADMIN — OXYCODONE HYDROCHLORIDE SCH MG: 15 TABLET ORAL at 01:05

## 2017-09-19 RX ADMIN — METHADONE HYDROCHLORIDE SCH MG: 5 TABLET ORAL at 08:01

## 2017-09-19 RX ADMIN — FAMOTIDINE SCH MG: 20 TABLET, FILM COATED ORAL at 01:05

## 2017-09-19 RX ADMIN — DEXMEDETOMIDINE HYDROCHLORIDE SCH MLS: 100 INJECTION, SOLUTION, CONCENTRATE INTRAVENOUS at 20:13

## 2017-09-19 RX ADMIN — ENOXAPARIN SODIUM SCH MG: 100 INJECTION SUBCUTANEOUS at 07:59

## 2017-09-19 RX ADMIN — THERA TABS SCH: TAB at 08:02

## 2017-09-19 RX ADMIN — Medication SCH MG: at 08:01

## 2017-09-19 RX ADMIN — OXYCODONE HYDROCHLORIDE SCH MG: 15 TABLET ORAL at 08:01

## 2017-09-19 RX ADMIN — FAMOTIDINE SCH MG: 20 TABLET, FILM COATED ORAL at 21:25

## 2017-09-19 RX ADMIN — DEXMEDETOMIDINE HYDROCHLORIDE SCH MLS: 100 INJECTION, SOLUTION, CONCENTRATE INTRAVENOUS at 23:20

## 2017-09-19 RX ADMIN — FOLIC ACID SCH MG: 1 TABLET ORAL at 08:01

## 2017-09-19 RX ADMIN — HALOPERIDOL LACTATE PRN MG: 5 INJECTION, SOLUTION INTRAMUSCULAR at 17:58

## 2017-09-19 NOTE — HOSPPROG
Hospitalist Progress Note


Assessment/Plan: 





68 yo M with PMH of etoh abuse and w/d as well as prior w/d seizures presenting 

with etoh w/d





# etoh w/d: severe, requiring precedex gtt, scheduled ativan and PRN ativan to 

manage agitation and aggression. Requires ICU care and close monitoring. Will 

also continue prn haldol and when able to take PO start zyprexa. Will need 

further rehab to have any chance of getting clean from etoh. 


# etoh abuse: as above, longstanding issue, he stated on arrival that he was 

eager to quit and may [facility], currently unable to assess 

as he is so agitated and verbally abusive


# AGMA: secondary to alcoholic ketoacidosis and resolved with IVF


# alcoholic hepatitis: mild with only mildly increased bili and ast


# hypophosphatemia/hypomagnesemia: repleting per electrolyte protocol, improving


# anemia/thrombocytopenia: likely related to bone marrow suppression from heavy 

etoh abuse, trending


# dispo: IP status, remains in ICU on precedex gtt, high risk given need for 

precedex drip and high doses of IV benzo's


Care plan reviewed with Dr. Coy and multidisciplinary care team. 


Subjective: no significant overnight events, continues to require precedex and 

high doses of ativan for agitation, has self dc'ed 2 IVs


Objective: 


 Vital Signs











Temp Pulse Resp BP Pulse Ox


 


 36.1 C   66   20   123/77 H  97 


 


 09/19/17 12:00  09/19/17 12:00  09/19/17 12:00  09/19/17 12:00  09/19/17 12:00








 Laboratory Results





 09/19/17 02:20 





 09/19/17 02:20 





 











 09/18/17 09/19/17 09/20/17





 05:59 05:59 05:59


 


Intake Total 2910 2837 


 


Output Total 1605 2000 


 


Balance 1305 837 








alternating between somnolent and agitated/aggressive 


anicteric


rrr no mrg


cta to ant exam


soft nt nd


no cce


aggressive/agitated, verbally abusive, attempted to bite nurse





ICD10 Worksheet


Patient Problems: 


 Problems











Problem Status Onset


 


Acute alcohol intoxication Acute  


 


Alcohol withdrawal Acute  


 


Alcoholism Acute

## 2017-09-20 LAB
MAGNESIUM SERPL-MCNC: 1.8 MG/DL (ref 1.6–2.3)
POTASSIUM SERPL-SCNC: 3.5 MEQ/L (ref 3.5–5.2)
POTASSIUM SERPL-SCNC: 3.8 MEQ/L (ref 3.5–5.2)

## 2017-09-20 RX ADMIN — DEXMEDETOMIDINE HYDROCHLORIDE SCH MLS: 100 INJECTION, SOLUTION, CONCENTRATE INTRAVENOUS at 09:39

## 2017-09-20 RX ADMIN — FAMOTIDINE SCH MG: 20 TABLET, FILM COATED ORAL at 19:48

## 2017-09-20 RX ADMIN — POTASSIUM CHLORIDE SCH MLS: 200 INJECTION, SOLUTION INTRAVENOUS at 19:48

## 2017-09-20 RX ADMIN — DEXMEDETOMIDINE HYDROCHLORIDE SCH MLS: 100 INJECTION, SOLUTION, CONCENTRATE INTRAVENOUS at 13:21

## 2017-09-20 RX ADMIN — OXYCODONE HYDROCHLORIDE SCH MG: 15 TABLET ORAL at 12:01

## 2017-09-20 RX ADMIN — DEXMEDETOMIDINE HYDROCHLORIDE SCH MLS: 100 INJECTION, SOLUTION, CONCENTRATE INTRAVENOUS at 02:38

## 2017-09-20 RX ADMIN — HALOPERIDOL LACTATE SCH MG: 5 INJECTION, SOLUTION INTRAMUSCULAR at 11:57

## 2017-09-20 RX ADMIN — HALOPERIDOL LACTATE PRN MG: 5 INJECTION, SOLUTION INTRAMUSCULAR at 05:51

## 2017-09-20 RX ADMIN — Medication PRN MG: at 17:48

## 2017-09-20 RX ADMIN — NICOTINE SCH MG: 21 PATCH, EXTENDED RELEASE TOPICAL at 11:56

## 2017-09-20 RX ADMIN — DEXMEDETOMIDINE HYDROCHLORIDE SCH MLS: 100 INJECTION, SOLUTION, CONCENTRATE INTRAVENOUS at 05:41

## 2017-09-20 RX ADMIN — Medication SCH MG: at 12:01

## 2017-09-20 RX ADMIN — HALOPERIDOL LACTATE SCH MG: 5 INJECTION, SOLUTION INTRAMUSCULAR at 23:46

## 2017-09-20 RX ADMIN — FAMOTIDINE SCH MG: 20 TABLET, FILM COATED ORAL at 11:54

## 2017-09-20 RX ADMIN — METHADONE HYDROCHLORIDE SCH MG: 5 TABLET ORAL at 11:53

## 2017-09-20 RX ADMIN — METHADONE HYDROCHLORIDE SCH MG: 5 TABLET ORAL at 19:48

## 2017-09-20 RX ADMIN — OXYCODONE HYDROCHLORIDE SCH MG: 15 TABLET ORAL at 19:48

## 2017-09-20 RX ADMIN — FOLIC ACID SCH MG: 1 TABLET ORAL at 11:54

## 2017-09-20 RX ADMIN — POTASSIUM CHLORIDE SCH MLS: 200 INJECTION, SOLUTION INTRAVENOUS at 20:50

## 2017-09-20 RX ADMIN — HALOPERIDOL LACTATE SCH MG: 5 INJECTION, SOLUTION INTRAMUSCULAR at 17:51

## 2017-09-20 RX ADMIN — ENOXAPARIN SODIUM SCH MG: 100 INJECTION SUBCUTANEOUS at 11:54

## 2017-09-20 RX ADMIN — THERA TABS SCH EACH: TAB at 12:01

## 2017-09-20 NOTE — HOSPPROG
Hospitalist Progress Note


Assessment/Plan: 





66 yo M with PMH of etoh abuse and w/d as well as prior w/d seizures presenting 

with etoh w/d





# etoh w/d, severe: requiring precedex gtt, scheduled ativan,PRN ativan as well 

as scheduled and prn Haldol to manage agitation and aggression. Requires ICU 

care and close monitoring. Remains a danger to himself and others and requiring 

physical restraints as well. MVI/thiamine/folate. Strongly encourage OP rehab 

after discharge.


# etoh abuse: as above, longstanding issue, he stated on arrival that he was 

eager to quit and may [facility] resources [facility], currently unable to assess 

as he is so agitated and verbally abusive


# AGMA: secondary to alcoholic ketoacidosis and resolved with IVF


# alcoholic hepatitis: mild with only mildly increased bili and ast, will get 

repeat lfts in am to be sure trending down


# hypophosphatemia/hypomagnesemia: repleting per electrolyte protocol, improving


# anemia/thrombocytopenia: likely related to bone marrow suppression from heavy 

etoh abuse, trending


# dispo: IP status, remains in ICU on precedex gtt, high risk given need for 

precedex drip and high doses of IV benzo's


Care plan reviewed with Dr. Coy and multidisciplinary care team. 


Subjective: no significant overnight events, patient remains agitated/

aggressive when not heavily sedated


Objective: 


 Vital Signs











Temp Pulse Resp BP Pulse Ox


 


 37.2 C   57 L  16   116/74   94 


 


 09/20/17 05:55  09/20/17 12:00  09/20/17 12:00  09/20/17 12:00  09/20/17 12:00








 Laboratory Results





 09/19/17 02:20 





 09/20/17 04:00 





 











 09/19/17 09/20/17 09/21/17





 05:59 05:59 05:59


 


Intake Total 2837 3276 


 


Output Total 2000 1550 


 


Balance 837 1726 








alternating between somnolent and agitated/aggressive 


anicteric


rrr no mrg


cta to ant exam


soft nt nd


no cce


aggressive/agitated, verbally abusive when awake








ICD10 Worksheet


Patient Problems: 


 Problems











Problem Status Onset


 


Acute alcohol intoxication Acute  


 


Alcohol withdrawal Acute  


 


Alcoholism Acute

## 2017-09-20 NOTE — PDINTPN
Intensivist Progress Note


Assessment/Plan: 


Assessment:





Chronic alcohol abuse 





Alcohol withdrawal, DTs.  On the CIWA protocol.  On Precedex as well as 

scheduled and as needed Ativan.  Remains quite agitated and combative when 

sedation is lightened.





Acute encephalopathy.  Please see comments above.





Possible underlying psychiatric diagnoses?.  This may be contributing to his 

agitation and combativeness.  Going to scheduled Haldol may be of benefit.





History of tobacco tobacco abuse.  Chronic bronchitis





Metabolic:   Mild hyponatremia, hypomagnesemia.  On normal saline and 

replacement protocols.





DVT prophylaxis:  On enoxaparin.





GI prophylaxis:    On Pepcid.





Nutrition: Taking some po's





Left subclavian vein stenosis.  Antidated this hospitalization.  Found with 

PICC placement.








Plan:  Continue care in the intensive care unit.  Continue Ativan scheduled and 

p.r.n..  Increase Haldol to 2 mg IV q.6H scheduled for now.  Continue thiamin 

and CIWA protocol.  Decrease Precedex as possible.    Continue nicotine patch.  

Follow laboratory.  Continue intravenous fluids.  








35 minutes of critical care time spent directly with the patient.  Discussed 

with hospitalist, nursing, social work, in the ICU multi disciplinary team.





























Subjective: 





Somnolent secondary to medications and sedation.  Arouses, responds, oriented 

times 2


Objective: 





 Vital Signs











Temp Pulse Resp BP Pulse Ox


 


 37.2 C   56 L  14   94/62 L  95 


 


 09/20/17 05:55  09/20/17 14:00  09/20/17 14:00  09/20/17 14:00  09/20/17 14:00








 Laboratory Results





 09/19/17 02:20 





 09/20/17 04:00 





 











 09/19/17 09/20/17 09/21/17





 05:59 05:59 05:59


 


Intake Total 2837 3276 


 


Output Total 2000 1550 


 


Balance 837 1726 











 Laboratory Tests











  09/20/17





  04:00


 


Potassium  3.8


 


Magnesium  1.8














Physical Exam





- Physical Exam


General Appearance: no apparent distress, obtunded (Arouses, responds to simple 

questions and commands)


EENT: PERRL/EOMI, other (Nasal cannula at 2 L)


Neck: normal inspection (No JVD)


Respiratory: lungs clear (Anteriorly), decreased breath sounds (At bases), 

prolonged expiration, No rales, No rhonchi, No wheezing


Cardiac/Chest: bradycardia (Sinus in high 50s), No gallop


Abdomen: normal bowel sounds, non-tender, soft


Male Genitalia: other (Harrington catheter in place, good urine output.)


Skin: normal color, warm/dry


Extremities: pedal edema (Trace)


Neuro/Psych: no motor/sensory deficits (Moves all extremities equally, weak), 

No cognition abnormalities (Hard to assess with sedation.  Combative and 

belligerent when sedation is lightened.)





ICD10 Worksheet


Patient Problems: 


 Problems











Problem Status Onset


 


Acute alcohol intoxication Acute  


 


Alcohol withdrawal Acute  


 


Alcoholism Acute

## 2017-09-20 NOTE — ASMTCMCOM
CM Note

 

CM Note                       

Notes:

Patient continues to have severe EToH withdrawal, DTs. He is still on Precedex and requiring 

Ativan, and today intensivist added scheduled Haldol.  has tried to meet with patient 

multiple times to fill out MDPOA paperwork but patient is too agitated, rude, and confused. It 

appears he has a daughter named April, but we do not have her contact info.



I called the [facility] today, and patient was seen there three months ago. They do not have 

his daughter's contact information.



CM will follow for discharge planning when patient is able to participate. 

 

Date Signed:  09/20/2017 03:43 PM

Electronically Signed By:Roxie Berman RN

## 2017-09-21 LAB
ALBUMIN SERPL-MCNC: 3.4 G/DL (ref 3.5–5)
ALP SERPL-CCNC: 79 IU/L (ref 38–126)
ALT SERPL-CCNC: 72 IU/L (ref 21–72)
ANION GAP SERPL CALC-SCNC: 10 MEQ/L (ref 8–16)
AST SERPL-CCNC: 80 IU/L (ref 17–59)
BILIRUB SERPL-MCNC: 1.6 MG/DL (ref 0.1–1.4)
CALCIUM SERPL-MCNC: 8.9 MG/DL (ref 8.5–10.4)
CHLORIDE SERPL-SCNC: 102 MEQ/L (ref 97–110)
CO2 SERPL-SCNC: 21 MEQ/L (ref 22–31)
CREAT SERPL-MCNC: 0.6 MG/DL (ref 0.7–1.3)
GFR SERPL CREATININE-BSD FRML MDRD: > 60 ML/MIN/{1.73_M2}
GLUCOSE SERPL-MCNC: 86 MG/DL (ref 70–100)
MAGNESIUM SERPL-MCNC: 1.7 MG/DL (ref 1.6–2.3)
POTASSIUM SERPL-SCNC: 4 MEQ/L (ref 3.5–5.2)
POTASSIUM SERPL-SCNC: 4.5 MEQ/L (ref 3.5–5.2)
POTASSIUM SERPL-SCNC: 4.5 MEQ/L (ref 3.5–5.2)
PROT SERPL-MCNC: 6.4 G/DL (ref 6.3–8.2)
SODIUM SERPL-SCNC: 133 MEQ/L (ref 134–144)

## 2017-09-21 RX ADMIN — Medication PRN MG: at 18:23

## 2017-09-21 RX ADMIN — HALOPERIDOL LACTATE SCH MG: 5 INJECTION, SOLUTION INTRAMUSCULAR at 18:21

## 2017-09-21 RX ADMIN — HALOPERIDOL LACTATE SCH MG: 5 INJECTION, SOLUTION INTRAMUSCULAR at 05:55

## 2017-09-21 RX ADMIN — OXYCODONE HYDROCHLORIDE SCH MG: 15 TABLET ORAL at 20:27

## 2017-09-21 RX ADMIN — METHADONE HYDROCHLORIDE SCH MG: 5 TABLET ORAL at 09:36

## 2017-09-21 RX ADMIN — Medication PRN MG: at 18:32

## 2017-09-21 RX ADMIN — THERA TABS SCH EACH: TAB at 09:36

## 2017-09-21 RX ADMIN — METHADONE HYDROCHLORIDE SCH MG: 5 TABLET ORAL at 20:26

## 2017-09-21 RX ADMIN — ENOXAPARIN SODIUM SCH MG: 100 INJECTION SUBCUTANEOUS at 09:36

## 2017-09-21 RX ADMIN — HALOPERIDOL LACTATE SCH MG: 5 INJECTION, SOLUTION INTRAMUSCULAR at 23:45

## 2017-09-21 RX ADMIN — FAMOTIDINE SCH MG: 20 TABLET, FILM COATED ORAL at 09:36

## 2017-09-21 RX ADMIN — Medication PRN MG: at 19:23

## 2017-09-21 RX ADMIN — HALOPERIDOL LACTATE SCH MG: 5 INJECTION, SOLUTION INTRAMUSCULAR at 11:32

## 2017-09-21 RX ADMIN — NICOTINE SCH MG: 21 PATCH, EXTENDED RELEASE TOPICAL at 09:37

## 2017-09-21 RX ADMIN — Medication PRN MG: at 20:26

## 2017-09-21 RX ADMIN — OXYCODONE HYDROCHLORIDE SCH MG: 15 TABLET ORAL at 09:36

## 2017-09-21 RX ADMIN — FOLIC ACID SCH MG: 1 TABLET ORAL at 09:36

## 2017-09-21 RX ADMIN — Medication PRN MG: at 18:33

## 2017-09-21 RX ADMIN — Medication SCH MG: at 09:36

## 2017-09-21 RX ADMIN — FAMOTIDINE SCH MG: 20 TABLET, FILM COATED ORAL at 20:26

## 2017-09-21 NOTE — PDINTPN
Intensivist Progress Note


Assessment/Plan: 


Assessment:





Chronic alcohol abuse 





Alcohol withdrawal, DTs.  On the CIWA protocol.  Off Precedex.  ON scheduled 

Ativan and Haldol.   Much less agitated today, cooperative for me





Possible underlying psychiatric diagnoses?.  This may be contributing to his 

agitation and combativeness. Haldol seems to have helped.  Will decrease to 1 

mg q.6 hours scheduled from 2 mg q.6 hours.





History of tobacco tobacco abuse.  Chronic bronchitis





Metabolic:   Mild hyponatremia (133), hypomagnesemia.  On normal saline and 

replacement protocols.





DVT prophylaxis:  On enoxaparin.





GI prophylaxis:    On Pepcid.





Nutrition: Taking some po's





Left subclavian vein stenosis.  Antedated this hospitalization.  Found with 

PICC placement.








Plan:  Continue care in the intensive care unit.  Continue CIWA, decrease 

scheduled Haldol.  Will stop scheduled Ativan and go to Librium:  25 mg p.o. 

three times daily.  P.r.n. Ativan to continue if needed.  Continue thiamin.  

Hold Precedex as possible.   Continue nicotine patch.  Follow laboratory.  

Continue intravenous fluids. 








30 minutes of clinic time spent directly with the patient.  Discussed with 

hospitalist, nursing, and the ICU multi disciplinary team.











Subjective: 





  Calmer, more appropriate and cooperative today.  Off Precedex since yesterday.


Objective: 





 Vital Signs











Temp Pulse Resp BP Pulse Ox


 


 36.9 C   106 H  14   118/77   92 


 


 09/21/17 12:00  09/21/17 12:00  09/21/17 12:00  09/21/17 12:00  09/21/17 12:00








 Laboratory Results





 09/19/17 02:20 





 09/21/17 13:22 





 











 09/20/17 09/21/17 09/22/17





 05:59 05:59 05:59


 


Intake Total 3276 2738 


 


Output Total 1550 1050 


 


Balance 1726 1688 











 Laboratory Tests











  09/21/17





  05:45


 


Sodium  133 L


 


Potassium  4.5


 


Chloride  102


 


Carbon Dioxide  21 L


 


BUN  10


 


Creatinine  0.6 L


 


Glucose  86


 


Calcium  8.9


 


Phosphorus  4.0


 


Magnesium  1.7


 


Total Bilirubin  1.6 H


 


AST  80 H


 


ALT  72


 


Albumin  3.4 L














Physical Exam





- Physical Exam


General Appearance: other (  Somewhat somnolent but easily arousable and 

appropriately conversant.  In chair.)


EENT: PERRL/EOMI, other (  On room air)


Neck: normal inspection ( no JVD)


Respiratory: lungs clear ( anteriorly), decreased breath sounds ( at bases), 

rales ( few rales at bases), rhonchi ( few rhonchi), No stridor, No wheezing


Cardiac/Chest: regular rate, rhythm, No gallop ( too low 100s at times.)


Abdomen: normal bowel sounds, non-tender, soft


Skin: normal color, warm/dry


Extremities: pedal edema (  Trace +)


Neuro/Psych: no motor/sensory deficits ( moves all extremities equally, remains 

weak), cognition abnormalities ( improving, oriented to person, place, month 

and year but not to exact date.)





ICD10 Worksheet


Patient Problems: 


 Problems











Problem Status Onset


 


Acute alcohol intoxication Acute  


 


Alcohol withdrawal Acute  


 


Alcoholism Acute

## 2017-09-21 NOTE — HOSPPROG
Hospitalist Progress Note


Assessment/Plan: 





66 yo M with PMH of etoh abuse and w/d as well as prior w/d seizures presenting 

with etoh w/d





# etoh w/d, severe: requiring precedex gtt, scheduled ativan, PRN ativan as 

well as scheduled and prn Haldol to manage agitation and aggression. Requires 

ICU care and close monitoring. Today he has had some improvement in his 

aggression and will attempt to begin backing off of some of the medications. 


# etoh abuse: as above, longstanding issue, he stated on arrival that he was 

eager to quit and may [facility], CM involved, as w/d 

improves will begin to look at longer term 


# AGMA: secondary to alcoholic ketoacidosis and resolved with IVF


# alcoholic hepatitis: mild with only mildly increased bili and ast, will get 

repeat lfts in am to be sure trending down


# hypophosphatemia/hypomagnesemia: repleting per electrolyte protocol, improving


# anemia/thrombocytopenia: likely related to bone marrow suppression from heavy 

etoh abuse, trending


# dispo: IP status, remains in ICU on precedex gtt, high risk given need for 

precedex drip and high doses of IV benzo's


Care plan reviewed with Dr. Coy and multidisciplinary care team. 


Subjective: no significant overnight events, patient a bit more calm and 

cooperative today


Objective: 


 Vital Signs











Temp Pulse Resp BP Pulse Ox


 


 36.9 C   106 H  14   118/77   92 


 


 09/21/17 12:00  09/21/17 12:00  09/21/17 12:00  09/21/17 12:00  09/21/17 12:00








 Laboratory Results





 09/19/17 02:20 





 09/21/17 13:22 





 











 09/20/17 09/21/17 09/22/17





 05:59 05:59 05:59


 


Intake Total 3276 2738 


 


Output Total 1550 1050 


 


Balance 1726 1688 








alternating between somnolent and agitated/aggressive 


anicteric


rrr no mrg


cta to ant exam


soft nt nd


no cce


aggressive/agitated, verbally abusive when awake








ICD10 Worksheet


Patient Problems: 


 Problems











Problem Status Onset


 


Acute alcohol intoxication Acute  


 


Alcohol withdrawal Acute  


 


Alcoholism Acute

## 2017-09-22 LAB
ANION GAP SERPL CALC-SCNC: 9 MEQ/L (ref 8–16)
CALCIUM SERPL-MCNC: 9.2 MG/DL (ref 8.5–10.4)
CHLORIDE SERPL-SCNC: 102 MEQ/L (ref 97–110)
CO2 SERPL-SCNC: 26 MEQ/L (ref 22–31)
CREAT SERPL-MCNC: 0.6 MG/DL (ref 0.7–1.3)
GFR SERPL CREATININE-BSD FRML MDRD: > 60 ML/MIN/{1.73_M2}
GLUCOSE SERPL-MCNC: 88 MG/DL (ref 70–100)
MAGNESIUM SERPL-MCNC: 1.8 MG/DL (ref 1.6–2.3)
POTASSIUM SERPL-SCNC: 4.2 MEQ/L (ref 3.5–5.2)
POTASSIUM SERPL-SCNC: 4.5 MEQ/L (ref 3.5–5.2)
SODIUM SERPL-SCNC: 137 MEQ/L (ref 134–144)

## 2017-09-22 RX ADMIN — FAMOTIDINE SCH MG: 20 TABLET, FILM COATED ORAL at 08:34

## 2017-09-22 RX ADMIN — METHADONE HYDROCHLORIDE SCH MG: 5 TABLET ORAL at 19:56

## 2017-09-22 RX ADMIN — Medication SCH MG: at 08:33

## 2017-09-22 RX ADMIN — OXYCODONE HYDROCHLORIDE SCH MG: 15 TABLET ORAL at 19:56

## 2017-09-22 RX ADMIN — ONDANSETRON PRN MG: 4 TABLET, ORALLY DISINTEGRATING ORAL at 23:04

## 2017-09-22 RX ADMIN — OXYCODONE HYDROCHLORIDE SCH MG: 15 TABLET ORAL at 08:33

## 2017-09-22 RX ADMIN — HALOPERIDOL LACTATE SCH: 5 INJECTION, SOLUTION INTRAMUSCULAR at 12:40

## 2017-09-22 RX ADMIN — POLYETHYLENE GLYCOL 3350 SCH GM: 17 POWDER, FOR SOLUTION ORAL at 12:40

## 2017-09-22 RX ADMIN — METHADONE HYDROCHLORIDE SCH MG: 5 TABLET ORAL at 08:33

## 2017-09-22 RX ADMIN — ENOXAPARIN SODIUM SCH MG: 100 INJECTION SUBCUTANEOUS at 08:36

## 2017-09-22 RX ADMIN — THERA TABS SCH EACH: TAB at 08:34

## 2017-09-22 RX ADMIN — HALOPERIDOL LACTATE SCH MG: 5 INJECTION, SOLUTION INTRAMUSCULAR at 05:54

## 2017-09-22 RX ADMIN — FOLIC ACID SCH MG: 1 TABLET ORAL at 08:33

## 2017-09-22 RX ADMIN — FAMOTIDINE SCH MG: 20 TABLET, FILM COATED ORAL at 19:56

## 2017-09-22 RX ADMIN — NICOTINE SCH MG: 21 PATCH, EXTENDED RELEASE TOPICAL at 08:37

## 2017-09-22 NOTE — ASMTCMCOM
CM Note

 

CM Note                       

Notes:

PT still recommending SNF, although d/w Dr Coy who feels pt may improve enough over next couple 

days to be able to dc home, would probably need HHC in that case. Pt lives in basement apartment 

that he rents from friend. I met w/pt to discuss dc poc. He is agreeable to going to SNF at this 

point. He would like to go to facility in [place]. Pt has [facility] benefits. Will send referral to 

[facility] since they [facility] w[facility]; notified [facility]. PASRR may or may not trigger; will need to 

have SW complete this this weekend if pt will need SNF. 

 

Date Signed:  09/22/2017 05:38 PM

Electronically Signed By:Radha Evans RN

## 2017-09-22 NOTE — HOSPPROG
Hospitalist Progress Note


Assessment/Plan: 





66 yo M with PMH of etoh abuse and w/d as well as prior w/d seizures presenting 

with etoh w/d





etoh w/d, severe: much improved


   dc precedex


   stop librium after today


   transfer to floor





AGMA: secondary to alcoholic ketoacidosis and resolved with IVF





alcoholic hepatitis: mild with only mildly increased bili and ast, will get 

repeat lfts in am to be sure trending down





hypophosphatemia/hypomagnesemia: now eating


   dc electrolyte protocols





constipation: add miralax





anemia/thrombocytopenia: likely related to bone marrow suppression from heavy 

etoh abuse, trending





dispo: to floor


Subjective: alert.  case d/w dr escudero


Objective: 


 Vital Signs











Temp Pulse Resp BP Pulse Ox


 


 36.8 C   113 H  14   115/70   91 L


 


 09/22/17 08:00  09/22/17 08:00  09/22/17 08:00  09/22/17 08:00  09/22/17 08:00








 Laboratory Results





 09/19/17 02:20 





 09/22/17 05:50 





 











 09/21/17 09/22/17 09/23/17





 05:59 05:59 05:59


 


Intake Total 2738 1010 


 


Output Total 1050 950 


 


Balance 1688 60 














- Physical Exam


Constitutional: no apparent distress, appears nourished


Eyes: PERRL, anicteric sclera


Ears, Nose, Mouth, Throat: moist mucous membranes, hearing normal


Cardiovascular: regular rate and rhythym, no murmur, rub, or gallop


Respiratory: no respiratory distress, no rales or rhonchi


Gastrointestinal: normoactive bowel sounds, soft, non-tender abdomen


Genitourinary: No robledo in urethra


Skin: warm, normal color


Musculoskeletal: full muscle strength, no muscle tenderness


Neurologic: AAOx3, sensation intact bilaterally


Psychiatric: interacting appropriately, not anxious





ICD10 Worksheet


Patient Problems: 


 Problems











Problem Status Onset


 


Acute alcohol intoxication Acute  


 


Alcohol withdrawal Acute  


 


Alcoholism Acute

## 2017-09-22 NOTE — PDINTPN
Intensivist Progress Note


Assessment/Plan: 


Assessment:





Chronic alcohol abuse 





Alcohol withdrawal, DTs.  On the CIWA protocol.  Off Precedex.  ON scheduled 

Ativan and Haldol.   Withdrawal resolving.  Mental status significantly improved

, combativeness appears to be resolved.  Still can be somewhat impulsive.





Possible underlying psychiatric diagnoses?.  He states this is secondary to 

PTSD from [place].  [facility].  Has been on various 

benzodiazepines in the past.  Also has a history of depression, on no 

antidepressants.  Lives alone in [place], has worked as a rosas with 

furniture.  Has a daughter here but has had little contact with her recently 

secondary to his alcoholism.





History of tobacco tobacco abuse.  Chronic bronchitis





Metabolic:   Mild hyponatremia with, improving, 137 today, hypomagnesemia.  On 

normal saline and replacement protocols.





DVT prophylaxis:  On enoxaparin.





GI prophylaxis:    On Pepcid.





Nutrition: Taking some po's





History of chronic pain, narcotic abuse.  On methadone on admission





Left subclavian vein stenosis.  Antedated this hospitalization.  Found with 

PICC placement, incidental.











Plan:  Continue care, can transition to med surge status.  Continue CIWA, 

change Haldol back to p.r.n..  Continue Librium:  25 mg p.o. three times daily.

  


P.r.n. Ativan to continue if needed.  Continue thiamin.  Continue pain 

medications.  Increase activity and mobilization.  Advance diet.  May be able 

to be discharged home tomorrow.  Follow-up needs to be arranged:  [facility] 

[facility]  He lives in [place].  A local physician may be of benefit 

for him.  Psychiatric evaluation prior to discharge does need to be considered.











Subjective: 





Much more cooperative and appropriate today.  Oriented.  Still feels weak but 

clearly getting stronger.  Denies his shortness of breath or focal pain. Has 

diffuse body achiness.  His heavy use of alcohol discussed.  He states his 

psychiatric issues related to a PTSD from his service in the .


Objective: 





 Vital Signs











Temp Pulse Resp BP Pulse Ox


 


 36.8 C   113 H  14   115/70   91 L


 


 09/22/17 08:00  09/22/17 08:00  09/22/17 08:00  09/22/17 08:00  09/22/17 08:00








 Laboratory Results





 09/19/17 02:20 





 09/22/17 05:50 





 











 09/21/17 09/22/17 09/23/17





 05:59 05:59 05:59


 


Intake Total 2738 1010 


 


Output Total 1050 950 


 


Balance 1688 60 














Physical Exam





- Physical Exam


General Appearance: alert (But still mildly somnolent), no apparent distress


EENT: PERRL/EOMI, other (On room air)


Neck: normal inspection


Respiratory: lungs clear, decreased breath sounds (At the bases.  Mild central 

congestion), No rales, No wheezing


Cardiac/Chest: regular rate, rhythm (To sinus tach at times)


Abdomen: normal bowel sounds, non-tender, soft


Male Genitalia: other (Harrington catheter out, using urinal)


Skin: normal color, warm/dry


Extremities: pedal edema (Trace +)


Neuro/Psych: no motor/sensory deficits, cognition abnormalities (Resolving, 

oriented x3.  Better memory for recent events.)





ICD10 Worksheet


Patient Problems: 


 Problems











Problem Status Onset


 


Acute alcohol intoxication Acute  


 


Alcohol withdrawal Acute  


 


Alcoholism Acute

## 2017-09-23 VITALS — OXYGEN SATURATION: 96 %

## 2017-09-23 VITALS — HEART RATE: 88 BPM

## 2017-09-23 RX ADMIN — FAMOTIDINE SCH MG: 20 TABLET, FILM COATED ORAL at 08:25

## 2017-09-23 RX ADMIN — ONDANSETRON PRN MG: 4 TABLET, ORALLY DISINTEGRATING ORAL at 14:48

## 2017-09-23 RX ADMIN — METHADONE HYDROCHLORIDE SCH MG: 5 TABLET ORAL at 09:21

## 2017-09-23 RX ADMIN — Medication SCH MG: at 08:25

## 2017-09-23 RX ADMIN — THERA TABS SCH EACH: TAB at 08:25

## 2017-09-23 RX ADMIN — POLYETHYLENE GLYCOL 3350 SCH GM: 17 POWDER, FOR SOLUTION ORAL at 10:40

## 2017-09-23 RX ADMIN — CARBOXYMETHYLCELLULOSE SODIUM PRN DROP: 5 SOLUTION/ DROPS OPHTHALMIC at 08:24

## 2017-09-23 RX ADMIN — OXYCODONE HYDROCHLORIDE PRN MG: 15 TABLET ORAL at 21:04

## 2017-09-23 RX ADMIN — ENOXAPARIN SODIUM SCH MG: 100 INJECTION SUBCUTANEOUS at 08:29

## 2017-09-23 RX ADMIN — CARBOXYMETHYLCELLULOSE SODIUM PRN DROP: 5 SOLUTION/ DROPS OPHTHALMIC at 16:50

## 2017-09-23 RX ADMIN — FOLIC ACID SCH MG: 1 TABLET ORAL at 08:25

## 2017-09-23 RX ADMIN — CARBOXYMETHYLCELLULOSE SODIUM PRN DROP: 5 SOLUTION/ DROPS OPHTHALMIC at 14:49

## 2017-09-23 RX ADMIN — OXYCODONE HYDROCHLORIDE SCH MG: 15 TABLET ORAL at 08:25

## 2017-09-23 RX ADMIN — NICOTINE SCH MG: 21 PATCH, EXTENDED RELEASE TOPICAL at 08:26

## 2017-09-23 RX ADMIN — METHADONE HYDROCHLORIDE SCH MG: 5 TABLET ORAL at 21:04

## 2017-09-23 RX ADMIN — OXYCODONE HYDROCHLORIDE PRN MG: 15 TABLET ORAL at 16:49

## 2017-09-23 RX ADMIN — CARBOXYMETHYLCELLULOSE SODIUM PRN DROP: 5 SOLUTION/ DROPS OPHTHALMIC at 10:37

## 2017-09-23 RX ADMIN — OXYCODONE HYDROCHLORIDE PRN MG: 15 TABLET ORAL at 12:34

## 2017-09-23 NOTE — HOSPPROG
Hospitalist Progress Note


Assessment/Plan: 





*  Etoh withdrawal


   -improved


   -wean benzo to off


*  Metabolic encephalopathy - improved


*  Chronic pain


   -methadone, oxycodone


*  Acute weakness - SNF recommended


   -PT/OT











Subjective: c/o chronic pain uncontrolled, he treats at home with Etoh


Objective: 


 Vital Signs











Temp Pulse Resp BP Pulse Ox


 


 36.4 C   88   20   145/96 H  94 


 


 09/23/17 15:46  09/23/17 15:46  09/23/17 15:46  09/23/17 15:46  09/23/17 15:46








 Laboratory Results





 09/19/17 02:20 





 09/22/17 05:50 





 











 09/22/17 09/23/17 09/24/17





 05:59 05:59 05:59


 


Intake Total 1010 1250 


 


Output Total 950 0 


 


Balance 60 1250 














- Physical Exam


Constitutional: no apparent distress, appears nourished, not in pain


Cardiovascular: regular rate and rhythym, no murmur, rub, or gallop


Respiratory: no respiratory distress, no rales or rhonchi, clear to auscultation


Gastrointestinal: normoactive bowel sounds, soft, non-tender abdomen, no 

palpable masses


Skin: no rashes or abrasions, no fluctuance, no induration


Neurologic: AAOx3, sensation intact bilaterally


Psychiatric: interacting appropriately, not anxious, not encephalopathic, 

thought process linear





ICD10 Worksheet


Patient Problems: 


 Problems











Problem Status Onset


 


Acute alcohol intoxication Acute  


 


Alcohol withdrawal Acute  


 


Alcoholism Acute

## 2017-09-23 NOTE — ASMTCMCOM
CM Note

 

CM Note                       

Notes:

I went to speak with patient about d/c after hospitalist suggested that he was medically stable for 


d/c.



CM had spoken with patient about SNF rehab yesterday and he seemed agreeable. Today, patient 

questioned me as to what SNF was for, why he needed it, etc. Although he was normal and conversant 

for a few minutes, he quickly became agitated and innapropriate. His words were "Fuck the 

[place]. I'll call my friend tomorrow and he can take me home." When I said that if he chose to go 

home, we recommended home care, he began to belittle and berate me. I left the room and informed 

hospitalist of patient's desires. 



DAYNA Discharge plan: home independent tomorrow

 

Date Signed:  09/23/2017 11:30 AM

Electronically Signed By:Roxie Berman RN

## 2017-09-24 VITALS — DIASTOLIC BLOOD PRESSURE: 80 MMHG | TEMPERATURE: 98.1 F | RESPIRATION RATE: 14 BRPM | SYSTOLIC BLOOD PRESSURE: 126 MMHG

## 2017-09-24 RX ADMIN — Medication SCH MG: at 08:28

## 2017-09-24 RX ADMIN — POLYETHYLENE GLYCOL 3350 SCH GM: 17 POWDER, FOR SOLUTION ORAL at 11:53

## 2017-09-24 RX ADMIN — CARBOXYMETHYLCELLULOSE SODIUM PRN DROP: 5 SOLUTION/ DROPS OPHTHALMIC at 07:19

## 2017-09-24 RX ADMIN — OXYCODONE HYDROCHLORIDE PRN MG: 15 TABLET ORAL at 01:27

## 2017-09-24 RX ADMIN — OXYCODONE HYDROCHLORIDE PRN MG: 15 TABLET ORAL at 06:28

## 2017-09-24 RX ADMIN — OXYCODONE HYDROCHLORIDE PRN MG: 15 TABLET ORAL at 14:26

## 2017-09-24 RX ADMIN — METHADONE HYDROCHLORIDE SCH MG: 5 TABLET ORAL at 08:28

## 2017-09-24 RX ADMIN — ENOXAPARIN SODIUM SCH MG: 100 INJECTION SUBCUTANEOUS at 08:28

## 2017-09-24 RX ADMIN — OXYCODONE HYDROCHLORIDE PRN MG: 15 TABLET ORAL at 10:33

## 2017-09-24 RX ADMIN — THERA TABS SCH EACH: TAB at 08:28

## 2017-09-24 RX ADMIN — FOLIC ACID SCH MG: 1 TABLET ORAL at 08:28

## 2017-09-24 RX ADMIN — NICOTINE SCH MG: 21 PATCH, EXTENDED RELEASE TOPICAL at 08:28

## 2017-09-24 NOTE — GDS
[f rep st]



                                                             DISCHARGE SUMMARY





DISCHARGE DIAGNOSES:  

1.  Alcohol withdrawal.

2.  Metabolic encephalopathy.

3.  Chronic pain with continuous narcotic dependency.



HISTORY:  The patient is a 67-year-old male usually followed at the [facility], but had recent
ly [facility] and [facility] out of [facility].  His chronic pain subsequentl
y became severely exacerbated, and he started drinking alcohol heavily to control his pain.  He prese
nted to the hospital and underwent severe alcohol withdrawal requiring prolonged ICU stay.  He had pr
olonged metabolic encephalopathy.  All that is resolved at discharge.  He is alert, oriented x3, ambu
latory, and independent. 



The patient does wish to reestablish at the [facility] for pain management.  He requested a me
thadone prescription at discharge, which I am unable to provide as I do not have licensing for this i
ndication.  A small quantity of oxycodone was prescribed at discharge to give him time to reestablish
 his usual care at the [facility]. 



Greater than 30 minutes' time spent arranging this discharge.  Patient was seen and examined by me on
 the day of discharge.





Job #:  [id number]

## 2017-09-24 NOTE — ASMTCMCOM
CM Note

 

CM Note                       

Notes:

CM Discharge Note:



Per hospitalist, patient is able to discharge safely independently. OT has discharged. Patient will 


have a friend pick him up. [facility]. 

 

Date Signed:  09/24/2017 10:54 AM

Electronically Signed By:Roxie Berman RN

## 2017-09-25 NOTE — ASDISCHSUM
----------------------------------------------

Discharge Information

----------------------------------------------

Plan Status:Home with No Needs                       Medically Cleared to Leave:09/24/2017

Discharge Date:09/24/2017 03:14 PM                   CM D/C Disposition:Home, Routine, Self-Care

ADT D/C Disposition:Home, Routine, Self-Care         Projected Discharge Date:09/24/2017 11:00 AM

Transportation at D/C:                               Discharge Delay Reason:

Follow-Up Date:09/24/2017 11:00 AM                   Discharge Slot:

Final Diagnosis:

----------------------------------------------

Placement Information

----------------------------------------------

Referral Type:*Nursing Home/SNF                      Referral ID:[id number]

Provider Name:

Address 1:                                           Phone Number:

Address 2:                                           Fax Number:

City:                                                Selection Factors:

State:

 

----------------------------------------------

Patient Contact Information

----------------------------------------------

Contact Name:KLAUDIA                               Relationship:

Address:                                             Home Phone:

                                                     Work Phone:

City:                                                Alternate Phone:

[facility] Code:                                      Email:

----------------------------------------------

Financial Information

----------------------------------------------

Financial Class:HMO and PPO Plans

Primary Plan Desc:[facility]                           Primary Plan Number:[id number]

Secondary Plan Desc:                                 Secondary Plan Number:

 

 

----------------------------------------------

Assessment Information

----------------------------------------------

----------------------------------------------

[facility] Progress Note

----------------------------------------------

CM Note

 

CM Note                       

Notes:

68 yo male came to [facility] from the [facility], wants to detox and stop drinking. [facility] 

in [facility]. Patient is unsteady and in withdrawal, but did ask for help with Advance 

Directives. Case Management to follow with ETOH resources.

 

Date Signed:  09/17/2017 04:22 PM

Electronically Signed By:Sapphire Hall LCSW

 

 

----------------------------------------------

[facility] CM Progress Note

----------------------------------------------

CM Note

 

CM Note                       

Notes:

Patient continues to have severe EToH withdrawal, DTs. He is still on Precedex and requiring 

Ativan, and today intensivist added scheduled Haldol.  has tried to meet with patient 

multiple times to fill out MDPOA paperwork but patient is too agitated, rude, and confused. It 

appears he has a daughter named April, but we do not have her contact info.



I called the [facility] today, and patient was seen there three months ago. They do not have 

his daughter's contact information.



CM will follow for discharge planning when patient is able to participate. 

 

Date Signed:  09/20/2017 03:43 PM

Electronically Signed By:Roxie Berman RN

 

 

----------------------------------------------

[facility]NA Progress Note

----------------------------------------------

CM Note

 

CM Note                       

Notes:

PT still recommending SNF, although d/w Dr Coy who feels pt may improve enough over next couple 

days to be able to dc home, would probably need C in that case. Pt lives in basement apartment 

that he rents from friend. I met w/pt to discuss dc poc. He is agreeable to going to SNF at this 

point. He would like to go to facility in [place]. Pt has VA benefits. Will send referral to 

[facility] since they contract w/[facility]; notified Yenifer. PASRR may or may not trigger; will need to 

have SW complete this this weekend if pt will need SNF. 

 

Date Signed:  09/22/2017 05:38 PM

Electronically Signed By:Radha Evans RN

 

 

----------------------------------------------

[facility]NA Progress Note

----------------------------------------------

CM Note

 

CM Note                       

Notes:

I went to speak with patient about d/c after hospitalist suggested that he was medically stable for 


d/c.



CM had spoken with patient about SNF rehab yesterday and he seemed agreeable. Today, patient 

questioned me as to what SNF was for, why he needed it, etc. Although he was normal and conversant 

for a few minutes, he quickly became agitated and innapropriate. His words were "Fuck the 

[place]. I'll call my friend tomorrow and he can take me home." When I said that if he chose to go 

home, we recommended home care, he began to belittle and berate me. I left the room and informed 

hospitalist of patient's desires. 



CM Discharge plan: home independent tomorrow

 

Date Signed:  09/23/2017 11:30 AM

Electronically Signed By:Roxie Berman RN

 

 

----------------------------------------------

[facility] CM Progress Note

----------------------------------------------

CM Note

 

CM Note                       

Notes:

CM Discharge Note:



Per hospitalist, patient is able to discharge safely independently. OT has discharged. Patient will 


have a friend pick him up. [facility] 

 

Date Signed:  09/24/2017 10:54 AM

Electronically Signed By:Roxie Berman RN

 

 

----------------------------------------------

Intervention Information

----------------------------------------------

## 2017-11-28 ENCOUNTER — HOSPITAL ENCOUNTER (EMERGENCY)
Dept: HOSPITAL 80 - FED | Age: 67
Discharge: HOME | End: 2017-11-28
Payer: OTHER GOVERNMENT

## 2017-11-28 VITALS
SYSTOLIC BLOOD PRESSURE: 165 MMHG | RESPIRATION RATE: 18 BRPM | OXYGEN SATURATION: 95 % | DIASTOLIC BLOOD PRESSURE: 112 MMHG | HEART RATE: 113 BPM | TEMPERATURE: 97.5 F

## 2017-11-28 DIAGNOSIS — Z86.73: ICD-10-CM

## 2017-11-28 DIAGNOSIS — F17.200: ICD-10-CM

## 2017-11-28 DIAGNOSIS — F10.129: Primary | ICD-10-CM

## 2017-11-28 NOTE — EDPHY
H & P


Stated Complaint: intoxication


Time Seen by Provider: 11/28/17 13:17


HPI/ROS: 





CHIEF COMPLAINT:  Alcohol intoxication





HISTORY OF PRESENT ILLNESS:  Patient presents the ED with acute alcohol 

intoxication and requesting detox services.  The [facility] but is unwilling to seek further care there.  The 

patient has a past medical history significant for chronic pain.  He reportedly 

uses alcohol for medication of his chronic pain syndrome.  The patient denies 

suicidal or homicidal ideation.  The patient denies additional acute medical 

complaints.





REVIEW OF SYSTEMS:


A comprehensive 10 point review of systems is otherwise negative aside from 

elements mentioned in the history of present illness.


Source: Patient


Exam Limitations: No limitations





- Personal History


Current Tetanus/Diphtheria Vaccine: Unsure


Current Tetanus Diphtheria and Acellular Pertussis (TDAP): Unsure





- Medical/Surgical History


Hx Asthma: No


Hx Chronic Respiratory Disease: Yes


Hx Diabetes: No


Hx Cardiac Disease: No


Hx Renal Disease: No


Hx Cirrhosis: No


Hx Alcoholism: Yes


Hx HIV/AIDS: No


Hx Splenectomy or Spleen Trauma: No


Other PMH: ETOH and opiate abuse, chronic back and shoulder pain, R rotator 

cuff surgery x 2 PTSD, CVA, emphyzema.





- Social History


Smoking Status: Current every day smoker





- Physical Exam


Exam: 





General Appearance:  Disheveled male, no acute distress, belligerent


Eyes:  Pupils equal and round no pallor or injection


ENT, Mouth:  Poor dentition


Respiratory:  There are no retractions, lungs are clear to auscultation


Cardiovascular:  Regular rate and rhythm


Gastrointestinal:  Abdomen is soft and nontender, no masses, bowel sounds normal


Neurological:  A&O, normal motor function, normal sensory exam, normal cranial 

nerves


Skin:  Warm and dry, no rashes


Musculoskeletal:  Neck is supple nontender


Extremities:  symmetrical, full range of motion


Psychiatric:  Patient is oriented X 3, there is no agitation


Constitutional: 


 Initial Vital Signs











Temperature (C)  36.4 C   11/28/17 12:55


 


Heart Rate  113 H  11/28/17 12:55


 


Respiratory Rate  18   11/28/17 12:55


 


Blood Pressure  165/112 H  11/28/17 12:55


 


O2 Sat (%)  95   11/28/17 12:55








 











O2 Delivery Mode               Room Air














Allergies/Adverse Reactions: 


 





No Known Allergies Allergy (Verified 09/16/17 18:22)


 








Home Medications: 














 Medication  Instructions  Recorded


 


Methadone HCl [Methadone 5 mg (*)] 5 mg PO BID 09/17/17


 


oxyCODONE IR [Oxycodone Ir (*)] 5 mg PO Q4 PRN #30 tab 09/24/17














Medical Decision Making


ED Course/Re-evaluation: 





The patient presents to the ED requesting voluntary inpatient detox.





The patient has decided he is not interested in being evaluated for a voluntary 

admission at [facility].  He is not [facility] and is now requesting to be discharged from the emergency 

department.  The patient does not meet criteria for 72 hour mental health hold.

  The patient is ambulatory with a steady gait.





The patient is encouraged to use the [facility] as it is likely 

his best bet for assistance as an outpatient with his alcohol dependence.











Departure





- Departure


Disposition: Home, Routine, Self-Care


Clinical Impression: 


 Acute alcohol intoxication, Alcoholic intoxication





Condition: Good


Instructions:  Alcohol Intoxication (ED)


Additional Instructions: 


1. I recommend scheduling a follow-up appointment with [facility].


Referrals: 


[facility],. [Clinic] - As per Instructions

## 2017-11-30 ENCOUNTER — HOSPITAL ENCOUNTER (EMERGENCY)
Dept: HOSPITAL 80 - FED | Age: 67
Discharge: HOME | End: 2017-11-30
Payer: COMMERCIAL

## 2017-11-30 VITALS — TEMPERATURE: 98.1 F | RESPIRATION RATE: 18 BRPM

## 2017-11-30 VITALS — SYSTOLIC BLOOD PRESSURE: 154 MMHG | OXYGEN SATURATION: 95 % | HEART RATE: 103 BPM | DIASTOLIC BLOOD PRESSURE: 90 MMHG

## 2017-11-30 DIAGNOSIS — F17.200: ICD-10-CM

## 2017-11-30 DIAGNOSIS — Z86.73: ICD-10-CM

## 2017-11-30 DIAGNOSIS — E86.9: ICD-10-CM

## 2017-11-30 DIAGNOSIS — F10.929: Primary | ICD-10-CM

## 2017-11-30 LAB
% IMMATURE GRANULYOCYTES: 0.4 % (ref 0–1.1)
ABSOLUTE IMMATURE GRANULOCYTES: 0.03 10^3/UL (ref 0–0.1)
ABSOLUTE NRBC COUNT: 0 10^3/UL (ref 0–0.01)
ADD DIFF?: NO
ADD MORPH?: NO
ADD SCAN?: NO
ALBUMIN SERPL-MCNC: (no result) G/DL (ref 3.5–5)
ALBUMIN SERPL-MCNC: 4 G/DL (ref 3.5–5)
ALP SERPL-CCNC: (no result) IU/L (ref 38–126)
ALP SERPL-CCNC: 65 IU/L (ref 38–126)
ALT SERPL-CCNC: (no result) IU/L (ref 21–72)
ALT SERPL-CCNC: 48 IU/L (ref 21–72)
ANION GAP SERPL CALC-SCNC: (no result) MEQ/L (ref 8–16)
ANION GAP SERPL CALC-SCNC: 20 MEQ/L (ref 8–16)
AST SERPL-CCNC: (no result) IU/L (ref 17–59)
AST SERPL-CCNC: 74 IU/L (ref 17–59)
ATYPICAL LYMPHOCYTE FLAG: 0 (ref 0–99)
BILIRUB SERPL-MCNC: (no result) MG/DL (ref 0.1–1.4)
BILIRUB SERPL-MCNC: 0.6 MG/DL (ref 0.1–1.4)
BILIRUBIN-CONJUGATED: (no result) MG/DL (ref 0–0.5)
BILIRUBIN-CONJUGATED: 0.4 MG/DL (ref 0–0.5)
BILIRUBIN-UNCONJUGATED: (no result) MG/DL (ref 0–1.1)
BILIRUBIN-UNCONJUGATED: 0.2 MG/DL (ref 0–1.1)
CALCIUM SERPL-MCNC: (no result) MG/DL (ref 8.5–10.4)
CALCIUM SERPL-MCNC: 8.2 MG/DL (ref 8.5–10.4)
CHLORIDE SERPL-SCNC: (no result) MEQ/L (ref 97–110)
CHLORIDE SERPL-SCNC: 103 MEQ/L (ref 97–110)
CO2 SERPL-SCNC: (no result) MEQ/L (ref 22–31)
CO2 SERPL-SCNC: 16 MEQ/L (ref 22–31)
CREAT SERPL-MCNC: (no result) MG/DL (ref 0.7–1.3)
CREAT SERPL-MCNC: 0.6 MG/DL (ref 0.7–1.3)
ERYTHROCYTE [DISTWIDTH] IN BLOOD BY AUTOMATED COUNT: 15.7 % (ref 11.5–15.2)
ETHANOL SERPL-MCNC: (no result) MG/DL (ref 0–10)
ETHANOL SERPL-MCNC: 222 MG/DL (ref 0–10)
FRAGMENT RBC FLAG: 0 (ref 0–99)
GFR SERPL CREATININE-BSD FRML MDRD: (no result) ML/MIN/{1.73_M2}
GFR SERPL CREATININE-BSD FRML MDRD: > 60 ML/MIN/{1.73_M2}
GLUCOSE SERPL-MCNC: (no result) MG/DL (ref 70–100)
GLUCOSE SERPL-MCNC: 50 MG/DL (ref 70–100)
HCT VFR BLD CALC: 44 % (ref 40–51)
HGB BLD-MCNC: 15.2 G/DL (ref 13.7–17.5)
LEFT SHIFT FLG: 0 (ref 0–99)
LIPEMIA HEMOLYSIS FLAG: 90 (ref 0–99)
MCH RBC BLDCO QN: 32.6 PG (ref 27.9–34.1)
MCHC RBC AUTO-ENTMCNC: 34.5 G/DL (ref 32.4–36.7)
MCV RBC AUTO: 94.4 FL (ref 81.5–99.8)
NRBC-AUTO%: 0 % (ref 0–0.2)
PLATELET # BLD: 102 10^3/UL (ref 150–400)
PLATELET CLUMPS FLAG: 20 (ref 0–99)
PMV BLD AUTO: 10.3 FL (ref 8.7–11.7)
POTASSIUM SERPL-SCNC: (no result) MEQ/L (ref 3.5–5.2)
POTASSIUM SERPL-SCNC: 4.8 MEQ/L (ref 3.5–5.2)
PROT SERPL-MCNC: (no result) G/DL (ref 6.3–8.2)
PROT SERPL-MCNC: 6.7 G/DL (ref 6.3–8.2)
RBC # BLD AUTO: 4.66 10^6/UL (ref 4.4–6.38)
SODIUM SERPL-SCNC: (no result) MEQ/L (ref 134–144)
SODIUM SERPL-SCNC: 139 MEQ/L (ref 134–144)
SPECIMEN HEMOLYSIS: (no result)
SPECIMEN TURBIDITY: (no result)

## 2017-11-30 PROCEDURE — G0480 DRUG TEST DEF 1-7 CLASSES: HCPCS

## 2017-11-30 NOTE — EDPHY
H & P


Stated Complaint: ETOH/CP


HPI/ROS: 





CHIEF COMPLAINT: Abdominal pain, intoxication





HISTORY OF PRESENT ILLNESS:





This patient is a 67 year old male arriving via EMS complaining of upper 

abdominal pain, nausea, and difficulty breathing. He presented to this ED 11/28/ 17, two days ago, with acute alcohol intoxication and requesting detox 

services. He was recommended to follow up at the [facility] for further assistance.  

Today, he states that he feels weak and endorses cough   and dry heaving. His 

last drink was a few hours ago. He usually drinks about 1L of vodka per day.  

[facility], but he ran out 

and has not returned to the clinic for refill.  He has been self medicating 

with alcohol. The patient denies vomiting, diarrhea, fever (but describes a 

clammy forehead), urinary complaints, or other associated symptoms. 








REVIEW OF SYSTEMS:





A ten point review of systems was performed and is negative with the exception 

of the items mentioned in the HPI.





Past medical history:


1. Alcohol and opiate abuse 


2. Chronic back and shoulder pain 


3. CVA


4. Emphysema 


5. PTSD





Past surgical history: 


1. Back surgery 


2. Rotator cuff surgeries x 2 





Family history: Noncontributory. 





Social history: Current daily alcohol and tobacco use. Retired. Lives in 

[place]. 





General Appearance:  Alert.  Vital signs reviewed.  Blood pressure 167/101. 

Pulse ox 86-90 during my interview. 


Eyes:  Pupils equal and round, no conjunctival injection, no discharge. 

Anicteric.


ENT, Mouth:  Mucous membranes are moist, no oropharyngeal erythema or edema.


Neck:  No lymphadenopathy, supple.


Respiratory:  Distant breath sounds. 


Cardiovascular:  Regular tachycardia; no murmur, rub, or gallop.


Gastrointestinal:  Diffuse abdominal tenderness. No guarding. Abdomen is soft, 

no masses or organomegaly, bowel sounds normal.


Skin:  Warm and dry, no rashes on exposed skin, normal color.


Back:  Nontender to palpation over the thoracolumbar spine. 


Extremities:  No lower extremity edema, no calf tenderness or swelling.


Neurological:  Alert and oriented.  Moving all four extremities easily and 

equally.


Psychiatric: Slightly agitated affect.





- Personal History


Current Tetanus/Diphtheria Vaccine: Unsure


Current Tetanus Diphtheria and Acellular Pertussis (TDAP): Unsure





- Medical/Surgical History


Hx Asthma: No


Hx Chronic Respiratory Disease: Yes


Hx Diabetes: No


Hx Cardiac Disease: No


Hx Renal Disease: No


Hx Cirrhosis: No


Hx Alcoholism: Yes


Hx HIV/AIDS: No


Hx Splenectomy or Spleen Trauma: No


Other PMH: ETOH and opiate abuse, chronic back and shoulder pain, R rotator 

cuff surgery x 2 PTSD, CVA, emphyzema.





- Social History


Smoking Status: Current every day smoker


Constitutional: 


 Initial Vital Signs











Temperature (C)  36.7 C   11/30/17 13:15


 


Heart Rate  96   11/30/17 13:15


 


Respiratory Rate  18   11/30/17 13:15


 


Blood Pressure  167/101 H  11/30/17 13:15


 


O2 Sat (%)  96   11/30/17 13:15








 











O2 Delivery Mode               Room Air














Allergies/Adverse Reactions: 


 





No Known Allergies Allergy (Verified 09/16/17 18:22)


 








Home Medications: 














 Medication  Instructions  Recorded


 


NK [No Known Home Meds]  11/30/17














Medical Decision Making





- Diagnostics


EKG Interpretation: 





The 12 lead EKG was interpreted by myself. See hard copy and/or "[facility]" 

electronic copy for interpretation. Sinus rhythm, rate 85.


Imaging: I viewed and interpreted images myself


ED Course/Re-evaluation: 





This 67 year old male presents with upper abdominal pain, nausea, and shortness 

of breath with associated cough. The patient is hypertensive, tachycardia, and 

hypoxemic around 86-90% SpO2 during my interview. He is unable to confirm  

whether his discomfort is localized to his abdomen or chest, and gestures 

towards both areas when questioned. Plan for EKG, chest x-ray, labs including 

CBC, BMP, troponin, liver, lipase, EtOH. 





Chest x-ray negative for pneumonia.





An hour into his stay his blood pressure is somewhat improved 154/90.  Heart 

rate just over 103. He has been resting.  He received 1 L of normal saline.





Laboratory studies still pending. Chemistries and EtOH not processed. Plan for 

repeat blood draw, laboratory request. 





Alerted that the patient is complaining of alcohol withdrawal. Patient stated 

during my interview that his last drink was a couple hours prior to arrival. 

EtOH still pending. 





16:00 Reassessed patient. He has become unpleasant during my reassessment, 

yelling at me, and intends to leave the emergency department against medical 

advice.  He pulled out his own IV.





Laboratory values were finalized after the patient left the department.  He is 

noted to be hypoglycemic with a blood sugar of 50 at the time that the blood 

was drawn.  Blood alcohol was 222.








Differential Diagnosis: 





Considered a differential diagnosis that includes but is not limited to alcohol 

intoxication, alcohol withdrawal, pancreatitis, cholecystitis, and gastritis.





- Data Points


Laboratory Results: 


 Laboratory Results





 11/30/17 14:15 





 11/30/17 15:00 








Medications Given: 


 








Discontinued Medications





Sodium Chloride (Ns)  1,000 mls @ 0 mls/hr IV EDNOW ONE; Wide Open


   PRN Reason: Protocol


   Stop: 11/30/17 13:31


   Last Admin: 11/30/17 14:18 Dose:  1,000 mls








Departure





- Departure


Disposition: Home, Routine, Self-Care


Clinical Impression: 


Acute alcohol intoxication


Qualifiers:


 Complication of substance-induced condition: uncomplicated Qualified Code(s): 

F10.929 - Alcohol use, unspecified with intoxication, unspecified





Condition: Good


Instructions:  Abuse of Alcohol (ED)


Referrals: 


Patient,NotPresent [Unknown] - As per Instructions


Report Scribed for: Annette Rios


Report Scribed by: Whitney Baeza


Date of Report: 11/30/17


Time of Report: 14:27


Physician Review and Approval Statement: 





11/30/17 13:19


Portions of this note were transcribed by the medical scribe.  I, Dr. Annette Rios, personally performed the history, physical exam, and medical decision-

making; and confirmed the accuracy of the information in the transcribed note.

## 2017-11-30 NOTE — CPEKG
Heart Rate: 85

RR Interval: 706

P-R Interval: 168

QRSD Interval: 94

QT Interval: 372

QTC Interval: 443

P Axis: 71

QRS Axis: 71

T Wave Axis: 67

EKG Severity - NORMAL ECG -

EKG Impression: SINUS RHYTHM

Electronically Signed By: Annette Rios 30-Nov-2017 17:13:24

## 2017-12-05 ENCOUNTER — HOSPITAL ENCOUNTER (INPATIENT)
Dept: HOSPITAL 80 - FED | Age: 67
LOS: 2 days | Discharge: HOME | DRG: 897 | End: 2017-12-07
Attending: FAMILY MEDICINE | Admitting: FAMILY MEDICINE
Payer: OTHER GOVERNMENT

## 2017-12-05 DIAGNOSIS — F43.10: ICD-10-CM

## 2017-12-05 DIAGNOSIS — G89.29: ICD-10-CM
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
## 2017-12-05 DIAGNOSIS — F32.9: ICD-10-CM

## 2017-12-05 DIAGNOSIS — J43.9: ICD-10-CM

## 2017-12-05 DIAGNOSIS — F10.239: Primary | ICD-10-CM

## 2017-12-05 DIAGNOSIS — F10.229: ICD-10-CM

## 2017-12-05 DIAGNOSIS — Z72.0: ICD-10-CM

## 2017-12-05 DIAGNOSIS — Z23: ICD-10-CM

## 2017-12-05 DIAGNOSIS — Z86.73: ICD-10-CM

## 2017-12-05 LAB
% IMMATURE GRANULYOCYTES: 0.4 % (ref 0–1.1)
ABSOLUTE IMMATURE GRANULOCYTES: 0.02 10^3/UL (ref 0–0.1)
ABSOLUTE NRBC COUNT: 0 10^3/UL (ref 0–0.01)
ADD DIFF?: NO
ADD MORPH?: NO
ADD SCAN?: NO
ALBUMIN SERPL-MCNC: 4.8 G/DL (ref 3.5–5)
ALP SERPL-CCNC: 95 IU/L (ref 38–126)
ALT SERPL-CCNC: 158 IU/L (ref 21–72)
ANION GAP SERPL CALC-SCNC: 27 MEQ/L (ref 8–16)
ANION GAP SERPL CALC-SCNC: 31 MEQ/L (ref 8–16)
APTT BLD: 25.1 SEC (ref 23–38)
AST SERPL-CCNC: 338 IU/L (ref 17–59)
ATYPICAL LYMPHOCYTE FLAG: 0 (ref 0–99)
BILIRUB SERPL-MCNC: 1.5 MG/DL (ref 0.1–1.4)
BILIRUBIN-CONJUGATED: 1.1 MG/DL (ref 0–0.5)
BILIRUBIN-UNCONJUGATED: 0.4 MG/DL (ref 0–1.1)
CALCIUM SERPL-MCNC: 8 MG/DL (ref 8.5–10.4)
CALCIUM SERPL-MCNC: 8.7 MG/DL (ref 8.5–10.4)
CHLORIDE SERPL-SCNC: 101 MEQ/L (ref 97–110)
CHLORIDE SERPL-SCNC: 98 MEQ/L (ref 97–110)
CO2 SERPL-SCNC: 17 MEQ/L (ref 22–31)
CO2 SERPL-SCNC: 17 MEQ/L (ref 22–31)
COLOR UR: YELLOW
CREAT SERPL-MCNC: 0.5 MG/DL (ref 0.7–1.3)
CREAT SERPL-MCNC: 0.7 MG/DL (ref 0.7–1.3)
ERYTHROCYTE [DISTWIDTH] IN BLOOD BY AUTOMATED COUNT: 15.4 % (ref 11.5–15.2)
ETHANOL SERPL-MCNC: 327 MG/DL (ref 0–10)
FRAGMENT RBC FLAG: 0 (ref 0–99)
GFR SERPL CREATININE-BSD FRML MDRD: > 60 ML/MIN/{1.73_M2}
GFR SERPL CREATININE-BSD FRML MDRD: > 60 ML/MIN/{1.73_M2}
GLUCOSE SERPL-MCNC: 239 MG/DL (ref 70–100)
GLUCOSE SERPL-MCNC: 77 MG/DL (ref 70–100)
HCT VFR BLD CALC: 49 % (ref 40–51)
HGB BLD-MCNC: 16.8 G/DL (ref 13.7–17.5)
INR PPP: 1 (ref 0.83–1.16)
LEFT SHIFT FLG: 0 (ref 0–99)
LIPEMIA HEMOLYSIS FLAG: 90 (ref 0–99)
MAGNESIUM SERPL-MCNC: 1.9 MG/DL (ref 1.6–2.3)
MCH RBC BLDCO QN: 32.4 PG (ref 27.9–34.1)
MCHC RBC AUTO-ENTMCNC: 34.3 G/DL (ref 32.4–36.7)
MCV RBC AUTO: 94.4 FL (ref 81.5–99.8)
MUCOUS THREADS #/AREA URNS LPF: (no result) /LPF
NITRITE UR QL STRIP: NEGATIVE
NRBC-AUTO%: 0 % (ref 0–0.2)
PH UR STRIP: 5 [PH] (ref 5–7.5)
PHENCYCLIDINE URINE BCH: NEGATIVE NG/ML
PLATELET # BLD: 78 10^3/UL (ref 150–400)
PLATELET CLUMPS FLAG: 0 (ref 0–99)
PMV BLD AUTO: 10.9 FL (ref 8.7–11.7)
POTASSIUM SERPL-SCNC: 4.2 MEQ/L (ref 3.5–5.2)
POTASSIUM SERPL-SCNC: 4.8 MEQ/L (ref 3.5–5.2)
PROT SERPL-MCNC: 7.7 G/DL (ref 6.3–8.2)
PROTHROMBIN TIME: 13.4 SEC (ref 12–15)
RBC # BLD AUTO: 5.19 10^6/UL (ref 4.4–6.38)
RBC #/AREA URNS HPF: (no result) /HPF (ref 0–3)
SODIUM SERPL-SCNC: 145 MEQ/L (ref 134–144)
SODIUM SERPL-SCNC: 146 MEQ/L (ref 134–144)
SP GR UR STRIP: 1.02 (ref 1–1.03)
TETRAHYDROCANNABINOL URINE: > 800 NG/ML
WBC #/AREA URNS HPF: (no result) /HPF (ref 0–3)

## 2017-12-05 PROCEDURE — G0480 DRUG TEST DEF 1-7 CLASSES: HCPCS

## 2017-12-05 PROCEDURE — G0008 ADMIN INFLUENZA VIRUS VAC: HCPCS

## 2017-12-05 RX ADMIN — FOLIC ACID SCH MG: 1 TABLET ORAL at 11:02

## 2017-12-05 RX ADMIN — SODIUM CHLORIDE, SODIUM LACTATE, POTASSIUM CHLORIDE, AND CALCIUM CHLORIDE SCH MLS: 600; 310; 30; 20 INJECTION, SOLUTION INTRAVENOUS at 11:03

## 2017-12-05 RX ADMIN — SODIUM CHLORIDE, SODIUM LACTATE, POTASSIUM CHLORIDE, AND CALCIUM CHLORIDE SCH MLS: 600; 310; 30; 20 INJECTION, SOLUTION INTRAVENOUS at 20:02

## 2017-12-05 RX ADMIN — THERA TABS SCH EACH: TAB at 11:02

## 2017-12-05 RX ADMIN — ENOXAPARIN SODIUM SCH MG: 100 INJECTION SUBCUTANEOUS at 11:02

## 2017-12-05 RX ADMIN — THIAMINE HYDROCHLORIDE SCH MLS: 100 INJECTION, SOLUTION INTRAMUSCULAR; INTRAVENOUS at 11:03

## 2017-12-05 NOTE — EDPHY
H & P


Stated Complaint: ETOH


Time Seen by Provider: 12/05/17 01:37


HPI/ROS: 





HPI


The patient presents with alcohol intoxication, brought in by paramedics.  He 

called 911 asking for help with his drinking which has gotten worse over the 

last several weeks.  He says he has had multiple alcoholic drinks today and 

this is been similar for the last several days.  This is associated with nausea 

and 1 episode of vomiting.  He says he has not been eating much food at all.  

According to the paramedics he was hypertensive, tachycardic and initial blood 

glucose was 55.  As he responded to oral glucose with repeat blood sugar in the 

90s.  He has been seen here in the emergency department on November 28th and 

November 30th with similar complaints.  He was prior [facility] 

[facility], however a few weeks ago he stopped taking 

these medications and says he is medicating his chronic back and shoulder pain 

with his alcohol.





REVIEW OF SYSTEMS


Constitutional:  No fever, no chills.


Eyes:  No discharge.


ENT:  No sore throat.


Cardiovascular:  No chest pain, no palpitations.


Respiratory:  No cough, no shortness of breath.


Gastrointestinal:  No abdominal pain, positive for vomiting.


Genitourinary:  No hematuria.


Musculoskeletal:  No back pain.


Skin:  No rashes.


Neurological:  No headache.





PMHx:  Chronic back and shoulder pain, emphysema, PTSD





Soc Hx:  Lives in an apartment in [place], retired, 











PHYSICAL


General Appearance: Alert, no distress


Eyes: Pupils equal and round no pallor or injection


ENT, Mouth: Mucous membranes dry


Respiratory: There are no retractions, lungs are clear to auscultation


Cardiovascular:  Tachycardic rate with regular rhythm


Gastrointestinal:  Abdomen is soft and non-tender, no masses, bowel sounds 

normal 


Neurological:  A&O, moves all extremities


Skin:  Warm and dry, no rashes


Musculoskeletal: Neck is supple non tender 


Extremities:  symmetrical, full range of motion 


Psychiatric:  Patient is oriented X 3, there is no agitation 





Source: Patient, EMS


Exam Limitations: Intoxication





- Personal History


Current Tetanus Diphtheria and Acellular Pertussis (TDAP): Yes





- Medical/Surgical History


Hx Asthma: No


Hx Chronic Respiratory Disease: Yes


Hx Diabetes: No


Hx Cardiac Disease: No


Hx Renal Disease: No


Hx Cirrhosis: No


Hx Alcoholism: Yes


Hx HIV/AIDS: No


Hx Splenectomy or Spleen Trauma: No


Other PMH: ETOH and opiate abuse, chronic back and shoulder pain, R rotator 

cuff surgery x 2 PTSD, CVA, emphyzema.





- Social History


Smoking Status: Current every day smoker


Constitutional: 


 Initial Vital Signs











Temperature (C)  36.7 C   12/05/17 01:40


 


Heart Rate  104 H  12/05/17 01:40


 


Respiratory Rate  16   12/05/17 01:40


 


Blood Pressure  176/107 H  12/05/17 01:40


 


O2 Sat (%)  93   12/05/17 01:40








 











O2 Delivery Mode               Nasal Cannula


 


O2 (L/minute)                  2














Allergies/Adverse Reactions: 


 





No Known Allergies Allergy (Verified 09/16/17 18:22)


 








Home Medications: 














 Medication  Instructions  Recorded


 


NK [No Known Home Meds]  11/30/17














Medical Decision Making


Differential Diagnosis: 





67-year-old male with history of alcohol abuse presents brought in by ambulance 

asking for help because of too much alcohol use.  On arrival, he is tachycardic

, hypertensive, appears dehydrated.  He does complain of nausea and has had 

vomiting.





Differential diagnosis includes alcohol withdrawal, alcoholic ketoacidosis, 

dehydration, alcohol intoxication.





Is in the emergency department, patient was initially given 1 L of normal 

saline.  This improved his symptoms somewhat and he felt ready to drink fluids 

by mouth.  His labs were checked and did reveal an anion gap acidosis, this is 

most likely related to alcoholic ketoacidosis given glucose is normal and there 

are no signs of infection causing a lactic acidosis.  His fluids were switched 

to D5 normal saline and he was given a dose of thiamine before this.  He was 

given food to eat.





Repeat BMP demonstrates minimally improved anion gap.  Patient was exhibiting 

some signs of alcohol withdrawal including tachycardia and mild agitation and 

was given a total of 2 mg of Ativan IV.





He was given additional dextrose containing fluids.  He became tachycardic at 

rest and when he sits up his heart rate jumps into the 120s, though he 

generally appears sedate.  He says he generally feels unwell though is had no 

ongoing vomiting.  He does have a hand tremor, and says that he is seeing dust 

bunnies on the wall.  I think he is at risk of DTs and because of this I will 

admit him to the hospitalist service.  I have discussed the case with Dr. Ricardo 

and she will admit the patient.  I have ordered a bed in the Step-Down Unit.





- Data Points


Laboratory Results: 


 Laboratory Results





 12/05/17 02:02 





 12/05/17 04:35 





 











  12/05/17 12/05/17 12/05/17





  04:35 04:35 02:02


 


WBC      





    


 


RBC      





    


 


Hgb      





    


 


Hct      





    


 


MCV      





    


 


MCH      





    


 


MCHC      





    


 


RDW      





    


 


Plt Count      





    


 


MPV      





    


 


Neut % (Auto)      





    


 


Lymph % (Auto)      





    


 


Mono % (Auto)      





    


 


Eos % (Auto)      





    


 


Baso % (Auto)      





    


 


Nucleat RBC Rel Count      





    


 


Absolute Neuts (auto)      





    


 


Absolute Lymphs (auto)      





    


 


Absolute Monos (auto)      





    


 


Absolute Eos (auto)      





    


 


Absolute Basos (auto)      





    


 


Absolute Nucleated RBC      





    


 


Immature Gran %      





    


 


Immature Gran #      





    


 


Sodium    145 mEq/L H mEq/L  146 mEq/L H mEq/L





    (134-144)   (134-144) 


 


Potassium    4.8 mEq/L mEq/L  4.2 mEq/L mEq/L





    (3.5-5.2)   (3.5-5.2) 


 


Chloride    101 mEq/L mEq/L  98 mEq/L mEq/L





    ()   () 


 


Carbon Dioxide    17 mEq/l L mEq/l  17 mEq/l L mEq/l





    (22-31)   (22-31) 


 


Anion Gap    27 mEq/L H mEq/L  31 mEq/L H mEq/L





    (8-16)   (8-16) 


 


BUN    14 mg/dL mg/dL  15 mg/dL mg/dL





    (7-23)   (7-23) 


 


Creatinine    0.5 mg/dL L mg/dL  0.7 mg/dL mg/dL





    (0.7-1.3)   (0.7-1.3) 


 


Estimated GFR    > 60   > 60 





    


 


Glucose    239 mg/dL H D mg/dL  77 mg/dL mg/dL





    ()   () 


 


Calcium    8.0 mg/dL L mg/dL  8.7 mg/dL mg/dL





    (8.5-10.4)   (8.5-10.4) 


 


Phosphorus  Pending     





    


 


Magnesium  Pending     





    


 


Ethyl Alcohol      327 mg/dL H mg/dL





     (0-10) 














  12/05/17





  02:02


 


WBC  5.01 10^3/uL 10^3/uL





   (3.80-9.50) 


 


RBC  5.19 10^6/uL 10^6/uL





   (4.40-6.38) 


 


Hgb  16.8 g/dL g/dL





   (13.7-17.5) 


 


Hct  49.0 % %





   (40.0-51.0) 


 


MCV  94.4 fL fL





   (81.5-99.8) 


 


MCH  32.4 pg pg





   (27.9-34.1) 


 


MCHC  34.3 g/dL g/dL





   (32.4-36.7) 


 


RDW  15.4 % H %





   (11.5-15.2) 


 


Plt Count  78 10^3/uL L 10^3/uL





   (150-400) 


 


MPV  10.9 fL fL





   (8.7-11.7) 


 


Neut % (Auto)  75.4 % H %





   (39.3-74.2) 


 


Lymph % (Auto)  16.4 % %





   (15.0-45.0) 


 


Mono % (Auto)  7.4 % %





   (4.5-13.0) 


 


Eos % (Auto)  0.0 % L %





   (0.6-7.6) 


 


Baso % (Auto)  0.4 % %





   (0.3-1.7) 


 


Nucleat RBC Rel Count  0.0 % %





   (0.0-0.2) 


 


Absolute Neuts (auto)  3.78 10^3/uL 10^3/uL





   (1.70-6.50) 


 


Absolute Lymphs (auto)  0.82 10^3/uL L 10^3/uL





   (1.00-3.00) 


 


Absolute Monos (auto)  0.37 10^3/uL 10^3/uL





   (0.30-0.80) 


 


Absolute Eos (auto)  0.00 10^3/uL L 10^3/uL





   (0.03-0.40) 


 


Absolute Basos (auto)  0.02 10^3/uL 10^3/uL





   (0.02-0.10) 


 


Absolute Nucleated RBC  0.00 10^3/uL 10^3/uL





   (0-0.01) 


 


Immature Gran %  0.4 % %





   (0.0-1.1) 


 


Immature Gran #  0.02 10^3/uL 10^3/uL





   (0.00-0.10) 


 


Sodium  





  


 


Potassium  





  


 


Chloride  





  


 


Carbon Dioxide  





  


 


Anion Gap  





  


 


BUN  





  


 


Creatinine  





  


 


Estimated GFR  





  


 


Glucose  





  


 


Calcium  





  


 


Phosphorus  





  


 


Magnesium  





  


 


Ethyl Alcohol  





  











Medications Given: 


 





Dextrose/Sodium Chloride (D5w Ns)  1,000 mls @ 200 mls/hr IV CONT MELISSA


   Stop: 06/03/18 03:14


   Last Admin: 12/05/17 03:21 Dose:  1,000 mls


Thiamine HCl 100 mg/ Sodium (Chloride)  101 mls @ 202 mls/hr IV DAILY MELISSA


   Stop: 06/03/18 03:14


   Last Admin: 12/05/17 03:53 Dose:  101 mls





Discontinued Medications





Chlordiazepoxide HCl (Librium)  50 mg PO EDNOW ONE


   Stop: 12/05/17 06:34


   Last Admin: 12/05/17 06:40 Dose:  50 mg


Sodium Chloride (Ns)  1,000 mls @ 0 mls/hr IV EDNOW ONE; Wide [facility]


   PRN Reason: Protocol


   Stop: 12/05/17 01:41


   Last Admin: 12/05/17 02:01 Dose:  1,000 mls


Lorazepam (Ativan Injection)  1 mg IVP EDNOW ONE


   Stop: 12/05/17 03:11


   Last Admin: 12/05/17 03:22 Dose:  1 mg


Lorazepam (Ativan Injection)  1 mg IVP EDNOW ONE


   Stop: 12/05/17 05:02


   Last Admin: 12/05/17 05:10 Dose:  1 mg


Lorazepam (Ativan Injection)  1 mg IVP EDNOW ONE


   Stop: 12/05/17 06:36


   Last Admin: 12/05/17 06:40 Dose:  1 mg








Departure





- Departure


Disposition: [facility]


Clinical Impression: 


 Alcoholic ketoacidosis





Alcoholic intoxication


Qualifiers:


 Complication of substance-induced condition: with delirium Qualified Code(s): 

F10.921 - Alcohol use, unspecified with intoxication delirium





Alcohol withdrawal


Qualifiers:


 Complication of substance-induced condition: with delirium Qualified Code(s): 

F10.231 - Alcohol dependence with withdrawal delirium





Condition: Fair


Instructions:  Alcohol Intoxication (ED), At-Risk Alcohol Use (ED)


Additional Instructions: 


Please follow-up with your primary care doctor at the [facility].  You should 

return to the emergency department if your worse in any way.


Referrals: 


Patient,NotPresent [Unknown] - As per Instructions

## 2017-12-05 NOTE — ASMTCMCOM
CM Note

 

CM Note                       

Notes:

67 year old male admitted for N/V, ETOH W/D. Patient has chronic shoulder and back pain and had 

[facility]. Now using ETOH but would like to 

quit. Patient has a hx of emphasema, PTSD, CVA. He is a smoker. CM to give ETOH resources.

 

Date Signed:  12/05/2017 10:02 AM

Electronically Signed By:Sapphire Hall LCSW

## 2017-12-05 NOTE — PDMN
Medical Necessity


Medical necessity: Pt meets IP criteria per MD; est los >2 mn for eval/tx of 

alcohol withdrawal w/CIWA of 13, tachycardia, HTN, hypoglycemia & anion gap 

acidosis; hx alcohol abuse w/withdrawal & seizures, CVA, emphysema; per H&P & 

order 12/5/17

## 2017-12-05 NOTE — GHP
[f rep st]



                                                            HISTORY AND PHYSICAL





DATE OF ADMISSION:  12/05/2017



CHIEF COMPLAINT:  Alcohol withdrawal.



HISTORY OF PRESENT ILLNESS:  The patient is fairly somnolent in the morning when I saw him as he rece
ived several milligrams of Ativan and Librium.  He is an alcoholic who was brought in by paramedics mark nuñez he called 911, asking for help with his drinking.  According to paramedics, he was hypertensiv
e and tachycardic when they first saw him, and blood glucose was low at 55. 



He has a history of chronic pain and appears to have [facility] and was not
 taking his oxycodone and methadone for the last month or so.  He was treated for alcohol withdrawal 
in the emergency department, but did not get better, and thus was admitted.  Currently, he is not com
plaining of any pain.  He is fairly calm.  He is somnolent, but responsive to questions.



REVIEW OF SYSTEMS:  A 10-point review of systems was obtained and was negative.



PAST MEDICAL HISTORY:  

1.  PTSD.

2.  Chronic pain.

3.  Alcohol abuse with withdrawal and previous seizure.

4.  History of CVA.

5.  Emphysema.



SOCIAL HISTORY:  Drinks about a liter of vodka per day.



FAMILY HISTORY:  Reviewed and noncontributory.



PHYSICAL EXAM:  VITAL SIGNS:  Afebrile, blood pressure is 155/76, heart rate 104, oxygen saturation i
s 94% on room air.  GENERAL:  The patient is well developed, no apparent distress. HEENT:  Anicteric 
sclerae.  Extraocular movements intact.  Moist mucous membranes.  NECK:  Supple.  No thyromegaly.  ROBERT
NGS:  Good effort.  Clear to auscultation bilaterally.  CARDIOVASCULAR:  Likely tachycardic.  No murm
urs or gallops.  ABDOMEN:  Positive bowel sounds.  Soft, nontender, nondistended.  No hepatosplenomeg
lupe.  EXTREMITIES:  No clubbing, cyanosis, or edema.  SKIN:  Without rash. Dry, intact.  NEUROLOGIC: 
 Alert and oriented x3.  Moving all 4 extremities equally.  PSYCH:  Normal affect.



LABORATORY DATA:  Sodium is elevated at 145, anion gap 27, CO2 is 17, glucose 239.  CBCs essentially 
normal.  Alcohol level was 327.



ASSESSMENT:  A 67-year-old male presenting with alcohol withdrawal.



PLAN:  

1.  Alcohol withdrawal.  The patient is actually fairly stable this morning.  We will continue CIWA p
rotocol with dosing of Librium and Ativan.  We will see if he is able to do okay on the floor as he i
s pretty stable currently.

2.  Anion gap acidosis.  Probably related to his alcoholism and decreased p.o. intake.  He does have 
ketones in his urine.  We will continue to watch this.

3.  Chronic pain.

4.  

5.  





Job #:  [id number]

## 2017-12-06 LAB
% IMMATURE GRANULYOCYTES: 0.5 % (ref 0–1.1)
ABSOLUTE IMMATURE GRANULOCYTES: 0.02 10^3/UL (ref 0–0.1)
ABSOLUTE NRBC COUNT: 0 10^3/UL (ref 0–0.01)
ADD DIFF?: NO
ADD MORPH?: NO
ADD SCAN?: NO
ALBUMIN SERPL-MCNC: 4.1 G/DL (ref 3.5–5)
ALP SERPL-CCNC: 82 IU/L (ref 38–126)
ALT SERPL-CCNC: 157 IU/L (ref 21–72)
ANION GAP SERPL CALC-SCNC: 12 MEQ/L (ref 8–16)
AST SERPL-CCNC: 326 IU/L (ref 17–59)
ATYPICAL LYMPHOCYTE FLAG: 10 (ref 0–99)
BILIRUB SERPL-MCNC: 3 MG/DL (ref 0.1–1.4)
BILIRUBIN-CONJUGATED: 1.1 MG/DL (ref 0–0.5)
BILIRUBIN-UNCONJUGATED: 1.9 MG/DL (ref 0–1.1)
CALCIUM SERPL-MCNC: 9.7 MG/DL (ref 8.5–10.4)
CHLORIDE SERPL-SCNC: 95 MEQ/L (ref 97–110)
CK-MB INTERPRETATION: NEGATIVE
CO2 SERPL-SCNC: 30 MEQ/L (ref 22–31)
CREAT SERPL-MCNC: 0.5 MG/DL (ref 0.7–1.3)
CREATINE KINASE-MB FRACTION: 4.18 NG/ML (ref 0–3.19)
ERYTHROCYTE [DISTWIDTH] IN BLOOD BY AUTOMATED COUNT: 14.8 % (ref 11.5–15.2)
FRAGMENT RBC FLAG: 10 (ref 0–99)
GFR SERPL CREATININE-BSD FRML MDRD: > 60 ML/MIN/{1.73_M2}
GLUCOSE SERPL-MCNC: 91 MG/DL (ref 70–100)
HCT VFR BLD CALC: 41.8 % (ref 40–51)
HGB BLD-MCNC: 14.7 G/DL (ref 13.7–17.5)
LEFT SHIFT FLG: 0 (ref 0–99)
LIPEMIA HEMOLYSIS FLAG: 90 (ref 0–99)
MAGNESIUM SERPL-MCNC: 1.6 MG/DL (ref 1.6–2.3)
MCH RBC BLDCO QN: 32.1 PG (ref 27.9–34.1)
MCHC RBC AUTO-ENTMCNC: 35.2 G/DL (ref 32.4–36.7)
MCV RBC AUTO: 91.3 FL (ref 81.5–99.8)
NRBC-AUTO%: 0 % (ref 0–0.2)
PLATELET # BLD: 64 10^3/UL (ref 150–400)
PLATELET CLUMPS FLAG: 0 (ref 0–99)
PMV BLD AUTO: 10.8 FL (ref 8.7–11.7)
POTASSIUM SERPL-SCNC: 3.9 MEQ/L (ref 3.5–5.2)
PROT SERPL-MCNC: 7.3 G/DL (ref 6.3–8.2)
RBC # BLD AUTO: 4.58 10^6/UL (ref 4.4–6.38)
SODIUM SERPL-SCNC: 137 MEQ/L (ref 134–144)

## 2017-12-06 RX ADMIN — ENOXAPARIN SODIUM SCH MG: 100 INJECTION SUBCUTANEOUS at 09:39

## 2017-12-06 RX ADMIN — FOLIC ACID SCH MG: 1 TABLET ORAL at 09:39

## 2017-12-06 RX ADMIN — ONDANSETRON PRN MG: 2 SOLUTION INTRAMUSCULAR; INTRAVENOUS at 06:37

## 2017-12-06 RX ADMIN — ONDANSETRON PRN MG: 2 SOLUTION INTRAMUSCULAR; INTRAVENOUS at 13:23

## 2017-12-06 RX ADMIN — SODIUM CHLORIDE, SODIUM LACTATE, POTASSIUM CHLORIDE, AND CALCIUM CHLORIDE SCH MLS: 600; 310; 30; 20 INJECTION, SOLUTION INTRAVENOUS at 03:48

## 2017-12-06 RX ADMIN — THIAMINE HYDROCHLORIDE SCH MLS: 100 INJECTION, SOLUTION INTRAMUSCULAR; INTRAVENOUS at 09:40

## 2017-12-06 RX ADMIN — THERA TABS SCH EACH: TAB at 09:39

## 2017-12-06 RX ADMIN — SODIUM CHLORIDE, SODIUM LACTATE, POTASSIUM CHLORIDE, AND CALCIUM CHLORIDE SCH MLS: 600; 310; 30; 20 INJECTION, SOLUTION INTRAVENOUS at 15:13

## 2017-12-06 NOTE — HOSPPROG
Hospitalist Progress Note


Assessment/Plan: 





68 yo M w multiple recent admissions for alcohol withdrawal





alcohol withdrawal: continue CIWA


   change librium to scheduled





alcoholic hepatitis: discriminant function low


   needs cessation





AGMA: suspected ketosis


   resolved w IVF





proph: LMWH





dispo: inpt.





rosk: high


Subjective: nauseated.  alert.  CIWA still high, requirinf IV benzodiazepines (

= highrisk)


Objective: 


 Vital Signs











Temp Pulse Resp BP Pulse Ox


 


 36.8 C   79   16   133/82 H  91 L


 


 12/06/17 08:45  12/06/17 08:45  12/06/17 08:45  12/06/17 08:45  12/06/17 08:45








 Laboratory Results





 12/06/17 04:38 





 12/06/17 04:38 





 











 12/05/17 12/06/17 12/07/17





 05:59 05:59 05:59


 


Intake Total  5579 


 


Output Total  1230 150


 


Balance  4349 -150








 











PT  13.4 SEC (12.0-15.0)   12/05/17  07:20    


 


INR  1.00  (0.83-1.16)   12/05/17  07:20    














- Physical Exam


Constitutional: no apparent distress, appears nourished, unkempt


Eyes: PERRL, anicteric sclera


Ears, Nose, Mouth, Throat: moist mucous membranes, hearing normal


Cardiovascular: regular rate and rhythym, no murmur, rub, or gallop


Respiratory: no respiratory distress, no rales or rhonchi


Gastrointestinal: normoactive bowel sounds, soft, non-tender abdomen


Genitourinary: no bladder fullness, No robledo in urethra


Skin: warm, normal color


Musculoskeletal: full muscle strength, no muscle tenderness


Neurologic: AAOx3





ICD10 Worksheet


Patient Problems: 


 Problems











Problem Status Onset


 


Alcohol withdrawal Acute  


 


Alcoholic intoxication Acute  


 


Alcoholic ketoacidosis Acute  


 


Acute alcohol intoxication Acute  


 


Alcoholism Acute

## 2017-12-07 VITALS
DIASTOLIC BLOOD PRESSURE: 95 MMHG | HEART RATE: 83 BPM | OXYGEN SATURATION: 90 % | TEMPERATURE: 97.9 F | SYSTOLIC BLOOD PRESSURE: 141 MMHG

## 2017-12-07 VITALS — RESPIRATION RATE: 17 BRPM

## 2017-12-07 RX ADMIN — ENOXAPARIN SODIUM SCH MG: 100 INJECTION SUBCUTANEOUS at 09:27

## 2017-12-07 RX ADMIN — THIAMINE HYDROCHLORIDE SCH MLS: 100 INJECTION, SOLUTION INTRAMUSCULAR; INTRAVENOUS at 09:30

## 2017-12-07 RX ADMIN — FOLIC ACID SCH MG: 1 TABLET ORAL at 09:26

## 2017-12-07 RX ADMIN — SODIUM CHLORIDE, SODIUM LACTATE, POTASSIUM CHLORIDE, AND CALCIUM CHLORIDE SCH MLS: 600; 310; 30; 20 INJECTION, SOLUTION INTRAVENOUS at 02:07

## 2017-12-07 RX ADMIN — THERA TABS SCH EACH: TAB at 09:26

## 2017-12-07 NOTE — GDS
[f rep st]



                                                             DISCHARGE SUMMARY





DISCHARGE DIAGNOSES:  

1.  Alcohol withdrawal, mild.  

2.  Suspected depression. 

3.  Posttraumatic stress disorder. 

4.  Chronic pain.  

5.  Alcohol abuse with history of withdrawal seizures.

6.  History of cerebrovascular accident.  

7.  Emphysema.



HISTORY:  Please see admission history and physical by Dr. Kaylin Xavier.  The patient presented with mi
ld withdrawal and elevated alcohol level.  He had moderate withdrawal while here.  On the 3rd hospita
l day, the patient was alert, mentating, eating, with stable vital signs.  He is discharged home.  Gi
mauricio 3 days of Librium to ease the transition between alcohol withdrawal and hopefully resumption of s
obriety.  We set up home care, RN and PT at home.





Job #:  [id number]

## 2017-12-07 NOTE — HOSPPROG
Hospitalist Progress Note


Assessment/Plan: 





68 yo M w multiple recent admissions for alcohol withdrawal





alcohol withdrawal: continue CIWA


   dc on three days of low dose librium to ease transition





   will have truman cherry/DOUGLAS see re: community resources


   





alcoholic hepatitis: discriminant function low


   needs cessation





AGMA: suspected ketosis


   resolved w IVF





proph: LMWH





dispo: home today   


   >30 minutes


Subjective: alert, eating, showered


Objective: 


 Vital Signs











Temp Pulse Resp BP Pulse Ox


 


 36.6 C   83   17   141/95 H  90 L


 


 12/07/17 07:20  12/07/17 07:20  12/07/17 07:20  12/07/17 07:20  12/07/17 07:20








 Laboratory Results





 12/06/17 04:38 





 12/06/17 04:38 





 











 12/06/17 12/07/17 12/08/17





 05:59 05:59 05:59


 


Intake Total 5579 2831 


 


Output Total 1230 150 


 


Balance 4349 2681 








 











PT  13.4 SEC (12.0-15.0)   12/05/17  07:20    


 


INR  1.00  (0.83-1.16)   12/05/17  07:20    














- Physical Exam


Constitutional: no apparent distress, appears nourished


Eyes: PERRL, anicteric sclera


Ears, Nose, Mouth, Throat: moist mucous membranes, hearing normal


Cardiovascular: regular rate and rhythym, no murmur, rub, or gallop


Respiratory: no respiratory distress, no rales or rhonchi


Gastrointestinal: normoactive bowel sounds


Genitourinary: no bladder fullness, No robledo in urethra


Skin: warm


Musculoskeletal: full muscle strength


Neurologic: AAOx3





ICD10 Worksheet


Patient Problems: 


 Problems











Problem Status Onset


 


Alcohol withdrawal Acute  


 


Alcoholic intoxication Acute  


 


Alcoholic ketoacidosis Acute  


 


Acute alcohol intoxication Acute  


 


Alcoholism Acute

## 2017-12-07 NOTE — ASMTCMCOM
CM Note

 

CM Note                       

Notes:

Pt ready for DC today. Art Diaz felt pt would benefit from home care. PT/OT cleared him for 

SNF. Met with pt to discuss HC. Pt interested until he heard about being home bound and 

declined. [facility] Discussed pt's drinking and motivation to quit. Pt states that the 

most successful he has been at quitting was when he was incarcerated. He said he liked being sober 

but could not sustain after he was released. He has been to AA in the past but does not like 

listening to people talk. 

 

Date Signed:  12/07/2017 11:40 AM

Electronically Signed By:Hue iPttman LCSW

## 2017-12-07 NOTE — ASDISCHSUM
----------------------------------------------

Discharge Information

----------------------------------------------

Plan Status:Home with No Needs                       Medically Cleared to Leave:

Discharge Date:12/07/2017 12:45 PM                   CM D/C Disposition:Home, Routine, Self-Care

ADT D/C Disposition:Home, Routine, Self-Care         Projected Discharge Date:12/07/2017 12:45 PM

Transportation at D/C:                               Discharge Delay Reason:

Follow-Up Date:12/07/2017 12:45 PM                   Discharge Slot:

Final Diagnosis:ETOH W/D-Chronic back and shoulder pain

----------------------------------------------

Placement Information

----------------------------------------------

----------------------------------------------

Patient Contact Information

----------------------------------------------

Contact Name:TDGAURANG                               Relationship:

Address:                                             Home Phone:

                                                     Work Phone:

City:                                                Alternate Phone:

State/Zip Code:                                      Email:

----------------------------------------------

Financial Information

----------------------------------------------

Financial Class:

Primary Plan Desc:MEDICARE INPATIENT                 Primary Plan Number:[id number]

Secondary Plan Desc:                                 Secondary Plan Number:

 

 

----------------------------------------------

Assessment Information

----------------------------------------------

----------------------------------------------

[facility] Progress Note

----------------------------------------------

CM Note

 

CM Note                       

Notes:

67 year old male admitted for N/V, ETOH W/D. Patient has chronic shoulder and back pain and had 

[facility]. Now using ETOH but would like to 

quit. Patient has a hx of emphasema, PTSD, CVA. He is a smoker. CM to give ETOH resources.

 

Date Signed:  12/05/2017 10:02 AM

Electronically Signed By:Sapphire Hall LCSW

 

 

----------------------------------------------

[facility] CM Progress Note

----------------------------------------------

CM Note

 

CM Note                       

Notes:

Pt ready for DC today. Art Diaz felt pt would benefit from home care. PT/OT cleared him for 

SNF. Met with pt to discuss HC. Pt interested until he heard about being home bound and 

declined. [facility] Discussed pt's drinking and motivation to quit. Pt states that the 

most successful he has been at quitting was when he was incarcerated. He said he liked being sober 

but could not sustain after he was released. He has been to AA in the past but does not like 

listening to people talk. 

 

Date Signed:  12/07/2017 11:40 AM

Electronically Signed By:Hue Pittman LCSW

 

 

----------------------------------------------

Intervention Information

----------------------------------------------

## 2017-12-07 NOTE — PDIAF
- Diagnosis


Diagnosis: alcohol withdrawal


Code Status: Full Code





- Medication Management


Discharge Medications: 


 Medications to Continue on Transfer





chlordiazePOXIDE [Librium 10 mg (RX)] 10 mg PO TID #9 cap 12/07/17 [Last Taken 

Unknown]








Discharge Medications: Refer to the Discharge Home Medication list for PRN 

reason.





- Orders


Services needed: Home Care, Registered Nurse, Physical Therapy


Home Care Face to Face: I certify that this patient was under my care and that 

I had the required face-to-face encounter meeting the encounter requirements on 

the discharge day.  My findings support the fact that the patient is homebound 

as defined in


Home Care Face to Face Continued: CMS Chapter 7 Medicare Benefits Manual 30.1.1

, The condition of the patient is such that there exists a normal inability to 

leave home and consequently, leaving home would require a considerable and 

taxing effort.





- Follow Up Care


Current Providers and Referrals: 


Patient,NotPresent [Unknown] - As per Instructions

## 2018-01-02 ENCOUNTER — HOSPITAL ENCOUNTER (EMERGENCY)
Dept: HOSPITAL 80 - FED | Age: 68
Discharge: LEFT BEFORE DISCHARGE | End: 2018-01-02
Payer: COMMERCIAL

## 2018-01-02 VITALS
RESPIRATION RATE: 18 BRPM | SYSTOLIC BLOOD PRESSURE: 155 MMHG | TEMPERATURE: 98.2 F | HEART RATE: 106 BPM | OXYGEN SATURATION: 95 % | DIASTOLIC BLOOD PRESSURE: 116 MMHG

## 2018-01-02 DIAGNOSIS — F17.200: ICD-10-CM

## 2018-01-02 DIAGNOSIS — F10.929: Primary | ICD-10-CM

## 2018-01-02 DIAGNOSIS — Z86.73: ICD-10-CM

## 2018-01-02 NOTE — EDPHY
H & P


Stated Complaint: back pain, suicidal


Time Seen by Provider: 01/02/18 11:29


HPI/ROS: 





CHIEF COMPLAINT: "I drink too fucking much





HISTORY OF PRESENT ILLNESS:  67-year-old male arrives by ambulance , states to 

me that he has been drinking alcohol and is requesting assistance from alcohol 

detoxification.  When I interview him he specifically denies suicidal or 

homicidal ideation.  He denies hallucination.  Denies self-injury. Denies 

seizure.








************


PHYSICAL EXAM





(Prior to examination, patient consented to physical exam, hands were washed 

and my usual and customary physical exam procedures followed)


1) GENERAL: Well-developed, well-nourished, alert and oriented.  Appears to be 

in no acute distress. Agitated.


2) HEAD: Normocephalic


3) HEENT: sclera anicteric   


4) LUNGS: Breathing comfortably.   





Patient will not allow me to touch him.





- Personal History


Current Tetanus/Diphtheria Vaccine: Unsure


Current Tetanus Diphtheria and Acellular Pertussis (TDAP): Unsure





- Medical/Surgical History


Hx Asthma: No


Hx Chronic Respiratory Disease: Yes


Hx Diabetes: No


Hx Cardiac Disease: No


Hx Renal Disease: No


Hx Cirrhosis: No


Hx Alcoholism: Yes


Hx HIV/AIDS: No


Hx Splenectomy or Spleen Trauma: No


Other PMH: ETOH and opiate abuse, chronic back and shoulder pain, R rotator 

cuff surgery x 2 PTSD, CVA, emphyzema.





- Social History


Smoking Status: Current every day smoker


Constitutional: 





 Initial Vital Signs











Temperature (C)  36.8 C   01/02/18 11:19


 


Heart Rate  106 H  01/02/18 11:19


 


Respiratory Rate  18   01/02/18 11:19


 


Blood Pressure  155/116 H  01/02/18 11:19


 


O2 Sat (%)  95   01/02/18 11:19








 











O2 Delivery Mode               Room Air














Allergies/Adverse Reactions: 


 





No Known Allergies Allergy (Verified 01/02/18 11:29)


 








Home Medications: 














 Medication  Instructions  Recorded


 


chlordiazePOXIDE [Librium 10 mg 10 mg PO TID #9 cap 12/07/17





(RX)]  














Medical Decision Making


ED Course/Re-evaluation: 





1153 am: I specifically asked the patient is he was suicidal and he denies 

suicidality or homicidality at this time.  Does state that he has been drinking 

alcohol bandage requesting assistance from his alcohol detoxification.  I have 

offered to [facility] at which point he started 

yelling at me stating "I'm not going to the fucking [facility]".  At this time he is 

clinically sober with clear speech pattern , awake alert oriented to person 

place time events, stable steady gait, no evidence of altered mentation, no 

evidence of delirium tremens.  He then walked out of the emergency department 

Without receiving aftercare instructions.  Care of patient under supervision of

  secondary supervising physician Dr Ham . 





Departure





- Departure


Disposition: Against Medical Advice


Clinical Impression: 


 Alcohol use





Condition: Good


Referrals: 


NONE *PRIMARY CARE P,. [Primary Care Provider] - As per Instructions

## 2018-01-06 ENCOUNTER — HOSPITAL ENCOUNTER (EMERGENCY)
Dept: HOSPITAL 80 - FED | Age: 68
Discharge: HOME | End: 2018-01-06
Payer: COMMERCIAL

## 2018-01-06 VITALS
RESPIRATION RATE: 18 BRPM | SYSTOLIC BLOOD PRESSURE: 177 MMHG | OXYGEN SATURATION: 97 % | DIASTOLIC BLOOD PRESSURE: 85 MMHG | HEART RATE: 81 BPM | TEMPERATURE: 98.6 F

## 2018-01-06 DIAGNOSIS — R10.11: Primary | ICD-10-CM

## 2018-01-06 DIAGNOSIS — F10.929: ICD-10-CM

## 2018-01-06 DIAGNOSIS — J44.9: ICD-10-CM

## 2018-01-06 LAB — PLATELET # BLD: 148 10^3/UL (ref 150–400)

## 2018-01-06 PROCEDURE — G0480 DRUG TEST DEF 1-7 CLASSES: HCPCS

## 2018-01-06 NOTE — CPEKG
Heart Rate: 107

RR Interval: 561

P-R Interval: 164

QRSD Interval: 84

QT Interval: 344

QTC Interval: 459

P Axis: 72

QRS Axis: 67

T Wave Axis: 63

EKG Severity - BORDERLINE ECG -

EKG Impression: SINUS TACHYCARDIA

EKG Impression: BORDERLINE INFERIOR Q WAVES

Electronically Signed By: Ruben Ham 06-Jan-2018 13:01:04

## 2018-01-06 NOTE — EDPHY
H & P


Time Seen by Provider: 01/06/18 11:40


HPI/ROS: 





CHIEF COMPLAINT:  Abdominal pain





HISTORY OF PRESENT ILLNESS:  Patient flag down the mailman from his residence 

saying "I am having a heart attack "but actually has right upper quadrant 

abdominal pain, has been drinking vodka heavily.


Denies fall injury or trauma, some vomiting, no hematemesis or coffee-ground 

emesis or melena.  Says the symptoms are severe and started only today.





REVIEW OF SYSTEMS:


Eye: no change in vision


ENT: no sore throat


Cardiac: no chest pain or syncope


Pulmonary: no cough or SOB


Abdomen:  HPI


Musculoskeletal: no back pain


Skin: no rash


Neuro: no headache


Constitutional: no fever


: no urinary symptoms





A comprehensive 10 point review of systems is otherwise negative aside from 

elements mentioned in the history of present illness.





PAST MEDICAL HISTORY:  History is occult 12/5/2017 reviewed includes PTSD, 

chronic pain, stroke, COPD, alcoholism





Social history:  Drinking vodka today





General Appearance: Alert and conversant, cooperative.


Eyes: No scleral  icterus. 


ENT, Mouth: Normal mucous membranes.  No tongue laceration or abrasion.


Respiratory: Normal respiratory effort, breath sounds equal, lungs are clear to 

auscultation.


Cardiovascular:  Regular rate and rhythm.


Gastrointestinal:  6 right upper quadrant tenderness but no rebound or guarding

, negative Clark's.  No McBurney's point tenderness, no peritoneal signs.


Neurological:  Alert, face symmetric, slurring his speech, normal motor and 

sensory extremities.


Skin: Warm and dry, no rashes.


Musculoskeletal: No peripheral edema.


Psychiatric: Not agitated.





Emergency Department course/MDM:


Labs ordered to include LFTs and lipase.  EKG.


1252:  Labs reviewed including elevated alcohol level.


1340:  I was going to the patient's room to discharge him but he had already 

left.


Constitutional: 


 Initial Vital Signs











Temperature (C)  36.7 C   01/06/18 11:45


 


Heart Rate  120 H  01/06/18 11:45


 


Respiratory Rate  16   01/06/18 11:45


 


Blood Pressure  150/100 H  01/06/18 11:45


 


O2 Sat (%)  85 L  01/06/18 11:45








 











O2 Delivery Mode               Room Air


 


O2 (L/minute)                  2














Allergies/Adverse Reactions: 


 





No Known Allergies Allergy (Verified 01/02/18 11:29)


 








Home Medications: 














 Medication  Instructions  Recorded


 


chlordiazePOXIDE [Librium 10 mg 10 mg PO TID #9 cap 12/07/17





(RX)]  














Medical Decision Making





- Diagnostics


EKG Interpretation: 





12-lead EKG interpreted by me; official reading is in trace master.  My 

interpretation is sinus tachycardia, small inferior Q-waves noted, likely 

normal variant, no acute ST changes


Differential Diagnosis: 





Differential considered including but not limited to gallbladder disease, 

alcoholic hepatitis, pancreatitis, gastroenteritis, liver injury





- Data Points


Laboratory Results: 


 Laboratory Results





 01/06/18 12:10 





 01/06/18 12:10 





 











  01/06/18 01/06/18





  12:10 12:10


 


WBC    5.13 10^3/uL 10^3/uL





    (3.80-9.50) 


 


RBC    5.10 10^6/uL 10^6/uL





    (4.40-6.38) 


 


Hgb    16.6 g/dL g/dL





    (13.7-17.5) 


 


Hct    47.9 % %





    (40.0-51.0) 


 


MCV    93.9 fL fL





    (81.5-99.8) 


 


MCH    32.5 pg pg





    (27.9-34.1) 


 


MCHC    34.7 g/dL g/dL





    (32.4-36.7) 


 


RDW    15.9 % H %





    (11.5-15.2) 


 


Plt Count    148 10^3/uL L 10^3/uL





    (150-400) 


 


MPV    10.2 fL fL





    (8.7-11.7) 


 


Neut % (Auto)    57.8 % %





    (39.3-74.2) 


 


Lymph % (Auto)    26.1 % %





    (15.0-45.0) 


 


Mono % (Auto)    13.3 % H %





    (4.5-13.0) 


 


Eos % (Auto)    1.4 % %





    (0.6-7.6) 


 


Baso % (Auto)    1.2 % %





    (0.3-1.7) 


 


Nucleat RBC Rel Count    0.0 % %





    (0.0-0.2) 


 


Absolute Neuts (auto)    2.97 10^3/uL 10^3/uL





    (1.70-6.50) 


 


Absolute Lymphs (auto)    1.34 10^3/uL 10^3/uL





    (1.00-3.00) 


 


Absolute Monos (auto)    0.68 10^3/uL 10^3/uL





    (0.30-0.80) 


 


Absolute Eos (auto)    0.07 10^3/uL 10^3/uL





    (0.03-0.40) 


 


Absolute Basos (auto)    0.06 10^3/uL 10^3/uL





    (0.02-0.10) 


 


Absolute Nucleated RBC    0.00 10^3/uL 10^3/uL





    (0-0.01) 


 


Immature Gran %    0.2 % %





    (0.0-1.1) 


 


Immature Gran #    0.01 10^3/uL 10^3/uL





    (0.00-0.10) 


 


Sodium  147 mEq/L H mEq/L  





   (134-144)  


 


Potassium  4.5 mEq/L mEq/L  





   (3.5-5.2)  


 


Chloride  105 mEq/L mEq/L  





   ()  


 


Carbon Dioxide  21 mEq/l L mEq/l  





   (22-31)  


 


Anion Gap  21 mEq/L H mEq/L  





   (8-16)  


 


BUN  10 mg/dL mg/dL  





   (7-23)  


 


Creatinine  0.6 mg/dL L mg/dL  





   (0.7-1.3)  


 


Estimated GFR  > 60   





   


 


Glucose  84 mg/dL mg/dL  





   ()  


 


Calcium  9.5 mg/dL mg/dL  





   (8.5-10.4)  


 


Total Bilirubin  0.7 mg/dL mg/dL  





   (0.1-1.4)  


 


Conjugated Bilirubin  0.5 mg/dL mg/dL  





   (0.0-0.5)  


 


Unconjugated Bilirubin  0.2 mg/dL mg/dL  





   (0.0-1.1)  


 


AST  90 IU/L H IU/L  





   (17-59)  


 


ALT  87 IU/L H IU/L  





   (21-72)  


 


Alkaline Phosphatase  96 IU/L IU/L  





   ()  


 


Total Protein  7.5 g/dL g/dL  





   (6.3-8.2)  


 


Albumin  4.4 g/dL g/dL  





   (3.5-5.0)  


 


Lipase  234 IU/L IU/L  





   ()  


 


Ethyl Alcohol  364 mg/dL H mg/dL  





   (0-10)  














Departure





- Departure


Disposition: Home, Routine, Self-Care


Clinical Impression: 


Acute alcohol intoxication


Qualifiers:


 Complication of substance-induced condition: uncomplicated Qualified Code(s): 

F10.929 - Alcohol use, unspecified with intoxication, unspecified





Abdominal pain


Qualifiers:


 Abdominal location: right upper quadrant Qualified Code(s): R10.11 - Right 

upper quadrant pain





Condition: Good


Instructions:  Abdominal Pain (ED)


Referrals: 


[facility],. [Clinic] - As per Instructions

## 2018-03-05 ENCOUNTER — HOSPITAL ENCOUNTER (EMERGENCY)
Dept: HOSPITAL 80 - FED | Age: 68
LOS: 1 days | Discharge: HOME | End: 2018-03-06
Payer: OTHER GOVERNMENT

## 2018-03-05 DIAGNOSIS — M70.21: ICD-10-CM

## 2018-03-05 DIAGNOSIS — Z86.73: ICD-10-CM

## 2018-03-05 DIAGNOSIS — F17.200: ICD-10-CM

## 2018-03-05 DIAGNOSIS — F10.920: Primary | ICD-10-CM

## 2018-03-05 LAB — PLATELET # BLD: 247 10^3/UL (ref 150–400)

## 2018-03-05 NOTE — EDPHY
H & P


Smoking Status: Current every day smoker


Time Seen by Provider: 03/05/18 15:03


HPI/ROS: 





CHIEF COMPLAINT:  Alcohol intoxication, suicidal ideation





HISTORY OF PRESENT ILLNESS:  67-year-old male presents to the emergency 

department with acute alcohol intoxication and feeling suicidal.  The patient 

is asking me for "a cult 45".  The patient admits to drinking alcohol today.  

He denies any other substance abuse.  Denies homicidal ideation.  Denies chest 

pain or difficulty breathing.  Denies abdominal pain.  No reported trauma.





REVIEW OF SYSTEMS:


Constitutional:  No fever, no chills.


Eyes:  No double or blurry vision.


ENT:  No sore throat.


Respiratory:  No cough, no shortness of breath.


Cardiac:  No chest pain.


Gastrointestinal:  No abdominal pain, vomiting or diarrhea.


Genitourinary:  No dysuria.


Musculoskeletal:  No neck or back pain.


Skin:  No rashes.


Neurological:  No headache. (Delores Carbajal)


Past Medical/Surgical History: 





Alcoholism, chronic pain, CVA, emphysema, orthopedic injuries (Delores Carbajal)


Social History: 





 and lives in [place] (Delores Carbajal)


Physical Exam: 





General Appearance:  Alert, no distress.  Smells of alcohol.  Yelling.


Eyes:  Pupils equal and round.  Extraocular motions are all intact.


ENT:  Mouth:  Mucous membranes moist.


Respiratory:  No wheezing, rhonchi, or rales, lungs are clear to auscultation.


Cardiovascular:  Regular rate and rhythm.


Gastrointestinal:  Abdomen is soft and nontender, no masses, no rebound or 

guarding, bowel sounds normal.


Neurological:  Uncooperative, cannot determine.


Skin:  Warm and dry, no rashes.


Musculoskeletal:  Nontender to palpate along the cervical, thoracic or lumbar 

spine.  Neck is supple.


Extremities:  Full range of motion and no peripheral edema.


Psychiatric:  Moderately agitated. (Delores Carbajal)


Constitutional: 


 Initial Vital Signs











Temperature (C)  36.3 C   03/05/18 14:47


 


Heart Rate  112 H  03/05/18 14:47


 


Respiratory Rate  18   03/05/18 14:47


 


Blood Pressure  160/94 H  03/05/18 14:47


 


O2 Sat (%)  96   03/05/18 14:47








 











O2 Delivery Mode               Room Air














Allergies/Adverse Reactions: 


 





No Known Allergies Allergy (Verified 01/02/18 11:29)


 








Home Medications: 














 Medication  Instructions  Recorded


 


chlordiazePOXIDE [Librium 10 mg 10 mg PO TID #9 cap 12/07/17





(RX)]  


 


Cephalexin [Keflex (RX)] 500 mg PO QID 7 Days  cap 03/05/18














Medical Decision Making


ED Course/Re-evaluation: 





0315:  I did go and meet and speak with this patient.  He is requesting 

discharge.  He denies wanting to hurt himself or anybody else.  Mental health 

did meet with him and give him resources.  He is now 100% clinically sober.  He 

has no complaints.  He would like to be discharged.  Return precautions 

discussed with him.  He  Contracts for safety. (Zion Khan)





67-year-old male who is intoxicated with alcohol on M1 hold.  He is suicidal.  

When he is sober, he will be evaluated by mental health. (Delores Carbajal)


Differential Diagnosis: 





Depression including functional and major depression, situational depression, 

medication side effect, drugs and alcohol abuse.





Altered mental status including but not limited to hypoglycemia, infectious 

process, electrolyte abnormality, head injury and intoxicants. (Delores Carbajal)


Other Provider: 





I evaluated and participated in the management of the patient.  I also 

evaluated the patient independently.  My co-signature indicates that I have 

reviewed this chart and I agree with the findings and plan of care as 

documented.  





My personal H&P findings include:  67-year-old gentleman presents emergency 

department intoxicated, on our cold, reporting that he wants to"kill himself 

with [id number]".  Patient's initial alcohol level was 279. He is also complaining 

of pain in his right elbow.


I evaluated this patient at 11:00 p.m..  He has sobered appropriately currently 

has a breathalyzer level of 0.113.


On examination the patient is alert, oriented, he reports that he has a past 

history of PTSD and depression.  He does not have a current mental health 

provider.  He does not have a current primary care provider.  He denies 

suicidal ideation at this time but does report that he feels depressed.


Examination reveals clear lungs, regular rate rhythm, abdomen soft and tender, 

FAITH x 4, neurological exam nonfocal.  Right elbow:  Tender, swollen bursa.  

Warm to the touch.





xray of right elbow: no fracture.





Patient was given a dose of Keflex.  He was given Motrin for discomfort.





He wishes to speak to mental health about his depression. 





His care was assumed by Dr. Zion Khan at 11:30 p.m..  Patient will be 

evaluated by mental health when his alcohol level is less than 0.050.  


 (Renetta Sepulveda)


Care Turn Over: 





Care will be turned over to Dr. Renetta moyer at 5:15 p.m.. (Delores Carbajal)





- Data Points


Laboratory Results: 


 Laboratory Results





 03/05/18 23:45 





 03/05/18 23:45 








Medications Given: 


 








Discontinued Medications





Cephalexin HCl (Keflex)  500 mg PO EDNOW ONE


   PRN Reason: Protocol


   Stop: 03/05/18 23:18


   Last Admin: 03/05/18 23:30 Dose:  500 mg


Ibuprofen (Motrin)  600 mg PO EDNOW ONE


   Stop: 03/05/18 23:17


   Last Admin: 03/05/18 23:30 Dose:  600 mg


Lorazepam (Ativan)  1 mg PO EDNOW ONE


   Stop: 03/05/18 18:27


   Last Admin: 03/05/18 18:39 Dose:  1 mg








Departure





- Departure


Disposition: Home, Routine, Self-Care


Clinical Impression: 


 Olecranon bursitis of right elbow





Alcohol dependence


Qualifiers:


 Substance use status: uncomplicated Qualified Code(s): F10.20 - Alcohol 

dependence, uncomplicated





Alcoholic intoxication


Qualifiers:


 Complication of substance-induced condition: uncomplicated Qualified Code(s): 

F10.920 - Alcohol use, unspecified with intoxication, uncomplicated





Condition: Good


Instructions:  Elbow Bursitis (ED), Alcohol Intoxication (ED)


Additional Instructions: 


Please take antibiotics as directed.  Keflex 500 mg by mouth 4 times a day.  

Use ibuprofen as needed for discomfort as well as to help with the swelling.





Return to the emergency department if the swelling is getting worse, pain is 

worsening, you develop a fever, or other concerns.





Follow up with the primary care physician, Dr. Mitchell, as directed.  You 

should be seen within the next several days.





Please follow up with mental health as directed.





Stop drinking alcohol in excessive quantities.





Referrals: 


Sally Mitchell MD [Medical Doctor] - As per Instructions


Prescriptions: 


Cephalexin [Keflex (RX)] 500 mg PO QID 7 Days  cap

## 2018-03-06 VITALS
HEART RATE: 103 BPM | DIASTOLIC BLOOD PRESSURE: 93 MMHG | TEMPERATURE: 98.1 F | RESPIRATION RATE: 18 BRPM | OXYGEN SATURATION: 93 % | SYSTOLIC BLOOD PRESSURE: 153 MMHG

## 2018-03-09 ENCOUNTER — HOSPITAL ENCOUNTER (EMERGENCY)
Dept: HOSPITAL 80 - FED | Age: 68
Discharge: LEFT BEFORE DISCHARGE | End: 2018-03-09
Payer: OTHER GOVERNMENT

## 2018-03-09 VITALS
HEART RATE: 112 BPM | SYSTOLIC BLOOD PRESSURE: 127 MMHG | DIASTOLIC BLOOD PRESSURE: 91 MMHG | RESPIRATION RATE: 18 BRPM | OXYGEN SATURATION: 94 % | TEMPERATURE: 98.1 F

## 2018-03-09 DIAGNOSIS — Z53.21: Primary | ICD-10-CM

## 2018-03-09 NOTE — EDPHY
ED Progress Note


Narrative: 


I received report from the paramedics on arrival.


I did not personally evaluate or examine the patient.


I was informed after he left that the patient had walked out of the emergency 

department.

## 2018-03-09 NOTE — EDPHY
H & P


Time Seen by Provider: 03/09/18 18:53


HPI/ROS: 





CHIEF COMPLAINT:  I need you to sedate me





HISTORY OF PRESENT ILLNESS: [must have 4 elements]





REVIEW OF SYSTEMS:


Eye:[ no change in vision]


ENT: [no sore throat]


Cardiac: [no chest pain or syncope]


Pulmonary: [no cough or SOB]


Abdomen: [no vomiting, diarrhea, abdominal pain]


Musculoskeletal: [no back pain]


Skin: [no rash]


Neuro: [no headache]


Constitutional: [no fever]


: [no urinary symptoms]





A comprehensive 10 point review of systems is otherwise negative aside from 

elements mentioned in the history of present illness.





PAST MEDICAL HISTORY: 





Social history:  Recent alcohol





General Appearance: [Alert and conversant, cooperative.]


Eyes: [No scleral  icterus.] 


ENT, Mouth: [Normal mucous membranes.]


Respiratory: [Normal respiratory effort, breath sounds equal, lungs are clear 

to auscultation.]


Cardiovascular:  [Regular rate and rhythm.]


Gastrointestinal:  [Abdomen is soft and non tender.]


Neurological: [Alert, face symmetric, normal motor and sensory in extremities. ]


Skin: [Warm and dry, no rashes.]


Musculoskeletal: [No peripheral edema.]


Psychiatric: [Not agitated.]





Emergency Department course/MDM:


Smoking Status: Current every day smoker


Allergies/Adverse Reactions: 


 





No Known Allergies Allergy (Verified 01/02/18 11:29)


 








Home Medications: 














 Medication  Instructions  Recorded


 


chlordiazePOXIDE [Librium 10 mg 10 mg PO TID #9 cap 12/07/17





(RX)]  


 


Cephalexin [Keflex (RX)] 500 mg PO QID 7 Days  cap 03/05/18














Departure





- Departure


Referrals: 


Patient,NotPresent [Primary Care Provider] - As per Instructions

## 2018-03-23 ENCOUNTER — HOSPITAL ENCOUNTER (EMERGENCY)
Dept: HOSPITAL 80 - FED | Age: 68
Discharge: HOME | End: 2018-03-23
Payer: OTHER GOVERNMENT

## 2018-03-23 VITALS — TEMPERATURE: 97.9 F

## 2018-03-23 VITALS
OXYGEN SATURATION: 94 % | RESPIRATION RATE: 16 BRPM | SYSTOLIC BLOOD PRESSURE: 148 MMHG | HEART RATE: 101 BPM | DIASTOLIC BLOOD PRESSURE: 98 MMHG

## 2018-03-23 DIAGNOSIS — F10.229: Primary | ICD-10-CM

## 2018-03-23 DIAGNOSIS — Z86.73: ICD-10-CM

## 2018-03-23 DIAGNOSIS — F17.200: ICD-10-CM

## 2018-03-23 NOTE — EDPHY
H & P


Stated Complaint: ETOH abuse


Time Seen by Provider: 03/23/18 11:11


HPI/ROS: 





CHIEF COMPLAINT:  Intoxicated





HISTORY OF PRESENT ILLNESS:  The patient was brought into the emergency 

department by police with alcohol intoxication.  The patient reports that he 

has been drinking heavily today.  He requests that he be transferred to a detox 

center.  Patient has a history of chronic pain.  He reportedly is on oxycodone 

and methadone.  He typically gets his care at the [facility].  The patient denies any history of recent fall or trauma.  He has 

chronic pain in his left upper extremity from a biceps tendon injury.





REVIEW OF SYSTEMS:


A comprehensive 10 point review of systems is otherwise negative aside from 

elements mentioned in the history of present illness.


Source: Patient


Exam Limitations: No limitations





- Medical/Surgical History


Hx Asthma: No


Hx Chronic Respiratory Disease: Yes


Hx Diabetes: No


Hx Cardiac Disease: No


Hx Renal Disease: No


Hx Cirrhosis: No


Hx Alcoholism: Yes


Hx HIV/AIDS: No


Hx Splenectomy or Spleen Trauma: No


Other PMH: ETOH and opiate abuse, chronic back and shoulder pain, R rotator 

cuff surgery x 2 PTSD, CVA, emphyzema.





- Social History


Smoking Status: Current every day smoker





- Physical Exam


Exam: 





General Appearance:  Alert, no acute distress, alcohol on breath


Eyes:  Pupils equal and round no pallor or injection


ENT, Mouth:  Mucous membranes moist


Respiratory:  There are no retractions, lungs are clear to auscultation


Cardiovascular:  Regular rate and rhythm


Gastrointestinal:  Abdomen is soft and nontender, no masses, bowel sounds normal


Neurological:  A&O, normal motor function, normal sensory exam, normal cranial 

nerves


Skin:  Warm and dry, no rashes


Musculoskeletal:  Neck is supple nontender


Extremities:  Tenderness to palpation left biceps











Constitutional: 


 Initial Vital Signs











Temperature (C)  36.6 C   03/23/18 11:13


 


Heart Rate  110 H  03/23/18 11:13


 


Respiratory Rate  18   03/23/18 11:13


 


Blood Pressure  152/96 H  03/23/18 11:13


 


O2 Sat (%)  91 L  03/23/18 11:13








 











O2 Delivery Mode               Room Air














Allergies/Adverse Reactions: 


 





No Known Allergies Allergy (Verified 01/02/18 11:29)


 








Home Medications: 














 Medication  Instructions  Recorded


 


chlordiazePOXIDE [Librium 10 mg 10 mg PO TID #9 cap 12/07/17





(RX)]  


 


Cephalexin [Keflex (RX)] 500 mg PO QID 7 Days  cap 03/05/18














Medical Decision Making


ED Course/Re-evaluation: 





The patient presents to the ED with alcohol intoxication.  The patient has no 

complaints of suicidal or homicidal ideation.  He has no complaints of any 

traumatic injury.  He denies any acute pain.  The patient is interested in 

going to detox.





The patient is breathalyzer was 0.322 upon arrival.





The patient underwent serial examinations in the emergency department.  The 

patient is able to ambulate with assistance.  He was placed on a [facility] hold by 

the police department prior to arrival.





The patient will be transferred [facility] for further 

sobering.  He is transferred to that facility under the care of the police.








Differential Diagnosis: 





Differential diagnosis considered includes alcohol intoxication, alcohol 

withdrawal, dehydration, metabolic abnormality, occult trauma





- Data Points


Medications Given: 


 








Discontinued Medications





Chlordiazepoxide (Librium 25 Mg Prepack#6)  1 btl TAKEHOME EDNOW ONE


   Stop: 03/23/18 14:05


   Last Admin: 03/23/18 14:07 Dose:  1 btl








Departure





- Departure


Disposition: Home, Routine, Self-Care


Clinical Impression: 


 Alcohol dependence, Acute alcohol intoxication





Instructions:  Abuse of Alcohol (ED), Abuse of Alcohol (DC)


Referrals: 


[facility] [Outside] - As per Instructions

## 2018-03-29 ENCOUNTER — HOSPITAL ENCOUNTER (EMERGENCY)
Dept: HOSPITAL 80 - FED | Age: 68
Discharge: HOME | End: 2018-03-29
Payer: COMMERCIAL

## 2018-03-29 VITALS — SYSTOLIC BLOOD PRESSURE: 141 MMHG | DIASTOLIC BLOOD PRESSURE: 101 MMHG

## 2018-03-29 DIAGNOSIS — Z86.73: ICD-10-CM

## 2018-03-29 DIAGNOSIS — F10.929: Primary | ICD-10-CM

## 2018-03-29 DIAGNOSIS — J44.9: ICD-10-CM

## 2018-03-29 DIAGNOSIS — E86.9: ICD-10-CM

## 2018-03-29 DIAGNOSIS — F17.200: ICD-10-CM

## 2018-03-29 LAB — PLATELET # BLD: 107 10^3/UL (ref 150–400)

## 2018-03-29 PROCEDURE — G0480 DRUG TEST DEF 1-7 CLASSES: HCPCS

## 2018-03-29 NOTE — EDPHY
H & P


Time Seen by Provider: 03/29/18 14:43


HPI/ROS: 


Chief complaint.  Chest pain





HPI.  Patient is a 67-year-old male history of alcoholism says E add chest 

discomfort and racing heart at about noon today.  Better now.  He is not sure 

of the duration.  He describes the discomfort in the left anterior chest as

"sensitive".  There was no radiation.  He is tells me it was not worse with 

breathing, movement or exertion.  He says he was maybe a little short of breath 

with this.  No fever cough.  He has been having vomiting and diarrhea.  Last 

drink of alcohol was this morning.  Patient has been seen many times in our 

emergency department for alcohol intoxication





ROS


Constitutional.  no fever/chills, no weakness


Eyes.  no problems with vision


ENT.  no sore throat, no nasal drainage


Cardiovascular.  Chest pain and racing heart


Respiratory.  Slight shortness of breath


Abdominal.  no abdominal pain, no nausea/vomiting, no diarrhea


.  no problems urinating


MS.  no calf pain/swelling, no neck/back pain, no joint pain


Skin.  no rash


Lymph.  no swollen glands


Neuro.  no headache, no dizziness, no difficulty walking or with speech


Past Medical/Surgical History: 





Alcohol and opiate abuse, chronic pain, PTSD, CVA, COPD


Social History: 





Single, daily smoker, recent alcohol


Smoking Status: Current every day smoker


Physical Exam: 





General Appearance:  Alert well-developed male mild distress vital signs are 

stable


Eyes: Pupils equal and round no pallor or injection.


ENT, Mouth:  Mucous membranes are moist.


Respiratory:  There are no retractions, lungs are clear to auscultation.


Cardiovascular: Regular rate and rhythm.


Gastrointestinal:   Abdomen is soft and nontender, no masses, bowel sounds 

normal.


Neurological:  Awake and alert, sensory and motor exams grossly normal.


Skin: Warm and dry, no rashes.


Musculoskeletal:  Neck is supple nontender.


Extremities  symmetrical, full range of motion.


Psychiatric: Patient is oriented X 3, there is no agitation.


Constitutional: 


 Initial Vital Signs











Temperature (C)  36.4 C   03/29/18 14:29


 


Heart Rate  98   03/29/18 14:29


 


Respiratory Rate  16   03/29/18 14:29


 


Blood Pressure  141/101 H  03/29/18 14:29


 


O2 Sat (%)  94   03/29/18 14:29








 











O2 Delivery Mode               Room Air














Allergies/Adverse Reactions: 


 





No Known Allergies Allergy (Verified 01/02/18 11:29)


 








Home Medications: 














 Medication  Instructions  Recorded


 


chlordiazePOXIDE [Librium 10 mg 10 mg PO TID #9 cap 12/07/17





(RX)]  


 


Cephalexin [Keflex (RX)] 500 mg PO QID 7 Days  cap 03/05/18














Medical Decision Making





- Diagnostics


EKG Interpretation: 





EKG interpreted by me shows sinus tachycardia with normal interval and axis.  

QRS is normal there is no significant ST elevation or depression.  There is no 

arrhythmia.  The rate is 108


Imaging Results: 


Chest x-ray interpreted by me negative for pneumonia


Procedures: 





IV normal saline with 1 L given.  Librium orally in the emergency department


ED Course/Re-evaluation: 





Re-evaluation 4:15 p.m..  Patient is stable.  No chest discomfort.  He and I 

discussed imaging lab EKG results.  He is intoxicated both clinically and by 

blood alcohol.  He cannot find a sober ride to come pick him up.  He will be 

sent to alcohol recovery Center with Librium protocol


Differential Diagnosis: 





I considered acute coronary syndrome, alcohol intoxication, electrolyte 

abnormalities





- Data Points


Laboratory Results: 


 Laboratory Results





 03/29/18 15:20 





 03/29/18 15:20 








Medications Given: 


 








Discontinued Medications





Chlordiazepoxide HCl (Librium)  25 mg PO EDNOW ONE


   Stop: 03/29/18 15:09


   Last Admin: 03/29/18 15:38 Dose:  25 mg


Sodium Chloride (Ns)  1,000 mls @ 0 mls/hr IV EDNOW ONE; Wide Open


   PRN Reason: Protocol


   Stop: 03/29/18 15:09


   Last Admin: 03/29/18 15:20 Dose:  1,000 mls








Departure





- Departure


Disposition: Home, Routine, Self-Care


Clinical Impression: 


Acute alcohol intoxication


Qualifiers:


 Complication of substance-induced condition: uncomplicated Qualified Code(s): 

F10.929 - Alcohol use, unspecified with intoxication, unspecified





Condition: Fair


Instructions:  Alcohol Intoxication (ED)


Additional Instructions: 


Do not drink alcohol any more today.  Librium protocol at the addiction  

recovery Center.  Return for worsening symptoms


Referrals: 


NONE *PRIMARY CARE P,. [Primary Care Provider] - As per Instructions


[facility] [Outside] - 2-3 days, call for appt.

## 2018-03-29 NOTE — CPEKG
Heart Rate: 108

RR Interval: 556

P-R Interval: 172

QRSD Interval: 88

QT Interval: 328

QTC Interval: 440

P Axis: 75

QRS Axis: 73

T Wave Axis: 20

EKG Severity - BORDERLINE ECG -

EKG Impression: SINUS TACHYCARDIA

EKG Impression: BORDERLINE T WAVE ABNORMALITIES

Electronically Signed By: Srinivasan Lopez 29-Mar-2018 15:43:28

## 2018-04-01 ENCOUNTER — HOSPITAL ENCOUNTER (INPATIENT)
Dept: HOSPITAL 80 - FED | Age: 68
LOS: 4 days | Discharge: NURSING, REHAB OR LONG-TERM CARE | DRG: 371 | End: 2018-04-05
Attending: INTERNAL MEDICINE | Admitting: INTERNAL MEDICINE
Payer: OTHER GOVERNMENT

## 2018-04-01 DIAGNOSIS — F10.229: ICD-10-CM

## 2018-04-01 DIAGNOSIS — K70.10: ICD-10-CM

## 2018-04-01 DIAGNOSIS — F43.10: ICD-10-CM

## 2018-04-01 DIAGNOSIS — A03.9: ICD-10-CM

## 2018-04-01 DIAGNOSIS — F10.239: ICD-10-CM

## 2018-04-01 DIAGNOSIS — F17.210: ICD-10-CM

## 2018-04-01 DIAGNOSIS — F11.10: ICD-10-CM

## 2018-04-01 DIAGNOSIS — K85.20: ICD-10-CM

## 2018-04-01 DIAGNOSIS — A04.72: Primary | ICD-10-CM

## 2018-04-01 DIAGNOSIS — G89.29: ICD-10-CM

## 2018-04-01 DIAGNOSIS — E87.2: ICD-10-CM

## 2018-04-01 LAB — PLATELET # BLD: 104 10^3/UL (ref 150–400)

## 2018-04-01 PROCEDURE — G0480 DRUG TEST DEF 1-7 CLASSES: HCPCS

## 2018-04-01 PROCEDURE — G0378 HOSPITAL OBSERVATION PER HR: HCPCS

## 2018-04-01 RX ADMIN — ONDANSETRON PRN MG: 2 SOLUTION INTRAMUSCULAR; INTRAVENOUS at 20:08

## 2018-04-01 RX ADMIN — ENOXAPARIN SODIUM SCH MG: 100 INJECTION SUBCUTANEOUS at 09:15

## 2018-04-01 RX ADMIN — THERA TABS SCH EACH: TAB at 09:13

## 2018-04-01 RX ADMIN — OXYCODONE HYDROCHLORIDE PRN MG: 15 TABLET ORAL at 23:22

## 2018-04-01 RX ADMIN — FOLIC ACID SCH MG: 1 TABLET ORAL at 09:13

## 2018-04-01 RX ADMIN — DEXTROSE MONOHYDRATE AND SODIUM CHLORIDE SCH MLS: 5; .45 INJECTION, SOLUTION INTRAVENOUS at 09:07

## 2018-04-01 RX ADMIN — Medication SCH APP: at 20:10

## 2018-04-01 RX ADMIN — DEXTROSE MONOHYDRATE AND SODIUM CHLORIDE SCH MLS: 5; .45 INJECTION, SOLUTION INTRAVENOUS at 20:09

## 2018-04-01 RX ADMIN — OXYCODONE HYDROCHLORIDE PRN MG: 15 TABLET ORAL at 07:58

## 2018-04-01 RX ADMIN — THIAMINE HYDROCHLORIDE SCH MLS: 100 INJECTION, SOLUTION INTRAMUSCULAR; INTRAVENOUS at 09:08

## 2018-04-01 NOTE — HOSPPROG
Hospitalist Progress Note


Assessment/Plan: 





# acute pancreatitis


   - supportive care including IVF, pain control and NPO for now


# etOH abuse and w/d - hx severe w/d


   - cont CIWA, thiamine


# hepatitis, likely d/t etOH


   - recheck LFTs in am


# mild thrombocytopenia - recheck tomorrow


# AGMA - recheck


# diarrhea - no recurrence since admission





Subjective: sleeping; pain "better"


Objective: 


 Vital Signs











Temp Pulse Resp BP Pulse Ox


 


 36.8 C   103 H  18   153/96 H  92 


 


 04/01/18 11:41  04/01/18 11:41  04/01/18 11:41  04/01/18 11:41  04/01/18 11:41








 











 03/31/18 04/01/18 04/02/18





 05:59 05:59 05:59


 


Intake Total   2500


 


Balance   2500














- Physical Exam


Gastrointestinal: other (soft, mild voluntary guarding)





ICD10 Worksheet


Patient Problems: 


 Problems











Problem Status Onset


 


Acute alcohol intoxication Acute  


 


Alcoholism Acute  


 


Alcohol withdrawal Acute  


 


Alcoholic intoxication Acute  


 


Alcoholic ketoacidosis Acute  


 


Pancreatitis Acute  


 


Alcoholic hepatitis Acute

## 2018-04-01 NOTE — EDPHY
H & P


Stated Complaint: ETOH, loose stool


Time Seen by Provider: 04/01/18 03:10


HPI/ROS: 





HPI


The patient presents brought in by ambulance for diarrhea which has been 

present for the last 2-3 days.  He has had fecal incontinence with this and has 

had multiple episodes of loose nonbloody stools.  This is associated with 

nausea and occasional vomiting.  The patient is a severe alcohol abuser and has 

been drinking about 750 mL of hard alcohol daily.  His last drink was a few 

hours ago.  He feels generally weak and tired.  He was seen in the emergency 

department about 3 days ago for chest pain which persists.  He had a normal 

evaluation including chest x-ray, EKG.





REVIEW OF SYSTEMS


Constitutional:  No fever, no chills.


Eyes:  No discharge.


ENT:  No sore throat.


Cardiovascular:  No chest pain, no palpitations.


Respiratory:  No cough, no shortness of breath.


Gastrointestinal:  No abdominal pain, no vomiting.


Genitourinary:  No hematuria.


Musculoskeletal:  No back pain.


Skin:  No rashes.


Neurological:  No headache.





PMHx:Chronic pain, chronic alcohol abuse





Soc Hx:   lives in an apartment








PHYSICAL


General Appearance: Alert, covered in feces


Eyes: Pupils equal and round no pallor or injection


ENT, Mouth: Mucous membranes dry


Respiratory: There are no retractions, lungs are clear to auscultation


Cardiovascular:  Tachycardic rate and regular rhythm


Gastrointestinal:  Abdomen is soft and non-tender, no masses, bowel sounds 

normal 


Neurological:  A&O, moves all extremities


Skin:  Warm and dry, no rashes


Musculoskeletal: Neck is supple non tender 


Extremities:  symmetrical, full range of motion 


Psychiatric:  Patient is oriented X 3, there is no agitation 





Source: Patient, EMS


Exam Limitations: Intoxication





- Personal History


Current Tetanus/Diphtheria Vaccine: Yes


Current Tetanus Diphtheria and Acellular Pertussis (TDAP): Yes





- Medical/Surgical History


Hx Asthma: No


Hx Chronic Respiratory Disease: Yes


Hx Diabetes: No


Hx Cardiac Disease: No


Hx Renal Disease: No


Hx Cirrhosis: No


Hx Alcoholism: Yes


Hx HIV/AIDS: No


Hx Splenectomy or Spleen Trauma: No


Other PMH: ETOH and opiate abuse, chronic back and shoulder pain, R rotator 

cuff surgery x 2 PTSD, CVA, emphyzema.





- Social History


Smoking Status: Current every day smoker


Constitutional: 


 Initial Vital Signs











Temperature (C)  36.7 C   04/01/18 03:10


 


Heart Rate  83   04/01/18 03:10


 


Respiratory Rate  18   04/01/18 03:10


 


Blood Pressure  154/85 H  04/01/18 03:10


 


O2 Sat (%)  95   04/01/18 03:10








 











O2 Delivery Mode               Room Air














Allergies/Adverse Reactions: 


 





No Known Allergies Allergy (Verified 04/01/18 03:10)


 








Home Medications: 














 Medication  Instructions  Recorded


 


chlordiazePOXIDE [Librium 10 mg 10 mg PO TID #9 cap 12/07/17





(RX)]  


 


Cephalexin [Keflex (RX)] 500 mg PO QID 7 Days  cap 03/05/18














Medical Decision Making


Differential Diagnosis: 





67-year-old man with longstanding history of alcoholism presents brought in by 

ambulance with alcohol intoxication and diarrhea for the last 2-3 days.  On 

arrival, he is tachycardic, dehydrated appearing with obvious fecal incontinence

, stool is brown without any evidence of bleeding.





Differential diagnosis includes viral gastroenteritis, toxin mediated 

enterocolitis, C diff is a consideration.





In the emergency department, patient was given IV fluids.  He was given 

antiemetics and a dose of Ativan.  Labs were checked and did reveal an anion 

gap acidosis, most consistent with alcoholic ketoacidosis I believe.  Alcohol 

level was elevated, lipase was elevated in addition to his liver tests.





He was able to tolerate a small amount of food but his abdominal pain became 

worse with this.  He is persistently tachycardic despite fluids.  I have 

started him on dextrose containing fluids after a 2 L bolus.  I do not think he 

will do well at home.  I plan to admit him to the hospitalist service.  I have 

consulted with Dr. Shukla and have ordered him a bed in the hospital.  I 

have explained the plan to the patient.





- Data Points


Laboratory Results: 


 Laboratory Results





 04/01/18 04:30 





 04/01/18 04:30 





 











  04/01/18 04/01/18 04/01/18





  04:30 04:30 04:30


 


WBC      6.85 10^3/uL 10^3/uL





     (3.80-9.50) 


 


RBC      4.79 10^6/uL 10^6/uL





     (4.40-6.38) 


 


Hgb      15.6 g/dL g/dL





     (13.7-17.5) 


 


Hct      45.8 % %





     (40.0-51.0) 


 


MCV      95.6 fL fL





     (81.5-99.8) 


 


MCH      32.6 pg pg





     (27.9-34.1) 


 


MCHC      34.1 g/dL g/dL





     (32.4-36.7) 


 


RDW      15.5 % H %





     (11.5-15.2) 


 


Plt Count      104 10^3/uL L 10^3/uL





     (150-400) 


 


MPV      10.8 fL fL





     (8.7-11.7) 


 


Neut % (Auto)      78.9 % H %





     (39.3-74.2) 


 


Lymph % (Auto)      7.0 % L %





     (15.0-45.0) 


 


Mono % (Auto)      13.0 % %





     (4.5-13.0) 


 


Eos % (Auto)      0.0 % L %





     (0.6-7.6) 


 


Baso % (Auto)      0.4 % %





     (0.3-1.7) 


 


Nucleat RBC Rel Count      0.0 % %





     (0.0-0.2) 


 


Absolute Neuts (auto)      5.40 10^3/uL 10^3/uL





     (1.70-6.50) 


 


Absolute Lymphs (auto)      0.48 10^3/uL L 10^3/uL





     (1.00-3.00) 


 


Absolute Monos (auto)      0.89 10^3/uL H 10^3/uL





     (0.30-0.80) 


 


Absolute Eos (auto)      0.00 10^3/uL L 10^3/uL





     (0.03-0.40) 


 


Absolute Basos (auto)      0.03 10^3/uL 10^3/uL





     (0.02-0.10) 


 


Absolute Nucleated RBC      0.00 10^3/uL 10^3/uL





     (0-0.01) 


 


Immature Gran %      0.7 % %





     (0.0-1.1) 


 


Immature Gran #      0.05 10^3/uL 10^3/uL





     (0.00-0.10) 


 


Sodium    144 mEq/L mEq/L  





    (135-145)  


 


Potassium    4.7 mEq/L mEq/L  





    (3.5-5.2)  


 


Chloride    99 mEq/L mEq/L  





    ()  


 


Carbon Dioxide    16 mEq/l L D mEq/l  





    (22-31)  


 


Anion Gap    29 mEq/L H mEq/L  





    (8-16)  


 


BUN    15 mg/dL mg/dL  





    (7-23)  


 


Creatinine    0.5 mg/dL L mg/dL  





    (0.7-1.3)  


 


Estimated GFR    > 60   





    


 


Glucose    57 mg/dL L mg/dL  





    ()  


 


Calcium    9.2 mg/dL mg/dL  





    (8.5-10.4)  


 


Total Bilirubin    2.5 mg/dL H mg/dL  





    (0.1-1.4)  


 


Conjugated Bilirubin    1.9 mg/dL H mg/dL  





    (0.0-0.5)  


 


Unconjugated Bilirubin    0.6 mg/dL mg/dL  





    (0.0-1.1)  


 


AST    702 IU/L H IU/L  





    (17-59)  


 


ALT    315 IU/L H IU/L  





    (21-72)  


 


Alkaline Phosphatase    92 IU/L IU/L  





    ()  


 


Total Protein    8.6 g/dL H g/dL  





    (6.3-8.2)  


 


Albumin    4.9 g/dL g/dL  





    (3.5-5.0)  


 


Lipase  2681 IU/L H IU/L    





   ()   


 


Ethyl Alcohol    175 mg/dL H mg/dL  





    (0-10)  











Medications Given: 


 





Dextrose/Sodium Chloride (D5w Ns)  1,000 mls @ 200 mls/hr IV CONT MELISSA


   Stop: 09/28/18 05:29


   Last Admin: 04/01/18 05:51 Dose:  1,000 mls





Discontinued Medications





Sodium Chloride (Ns)  1,000 mls @ 0 mls/hr IV EDNOW ONE; Wide Open


   PRN Reason: Protocol


   Stop: 04/01/18 03:42


   Last Admin: 04/01/18 04:16 Dose:  1,000 mls


Sodium Chloride (Ns)  1,000 mls @ 0 mls/hr IV EDNOW ONE; Wide Open


   PRN Reason: Protocol


   Stop: 04/01/18 03:42


   Last Admin: 04/01/18 04:15 Dose:  1,000 mls


Famotidine/Sodium Chloride (Pepcid 20 Mg (Premix))  50 mls @ 200 mls/hr IV 

EDNOW ONE


   Stop: 04/01/18 03:55


   Last Admin: 04/01/18 04:16 Dose:  50 mls


Lorazepam (Ativan Injection)  1 mg IVP EDNOW ONE


   Stop: 04/01/18 05:05


   Last Admin: 04/01/18 05:07 Dose:  1 mg


Ondansetron HCl (Zofran)  4 mg IVP EDNOW ONE


   Stop: 04/01/18 03:42


   Last Admin: 04/01/18 04:16 Dose:  4 mg








Departure





- Departure


Disposition: [facility]


Clinical Impression: 


 Alcoholic ketoacidosis





Alcohol withdrawal


Qualifiers:


 Complication of substance-induced condition: uncomplicated Qualified Code(s): 

F10.230 - Alcohol dependence with withdrawal, uncomplicated





Acute alcohol intoxication


Qualifiers:


 Complication of substance-induced condition: uncomplicated Qualified Code(s): 

F10.929 - Alcohol use, unspecified with intoxication, unspecified





Pancreatitis


Qualifiers:


 Chronicity: acute Pancreatitis type: unspecified pancreatitis type Acute 

pancreatitis complication: unspecified Qualified Code(s): K85.90 - Acute 

pancreatitis without necrosis or infection, unspecified





Alcoholic hepatitis


Qualifiers:


 Ascites presence: unspecified Qualified Code(s): K70.10 - Alcoholic hepatitis 

without ascites





Condition: Fair


Referrals: 


NONE *PRIMARY CARE P,. [Primary Care Provider] - As per Instructions

## 2018-04-01 NOTE — PDHOSCONS
History and Physical





- Chief Complaint


Abdominal pain





- History of Present Illness


66 yo  w/ PTSD and depression presents with abdominal pain. Patient was 

found by EMS covered in feces. He tells me he has had severe abdominal pain, 

vomiting, and inability to tolerate PO for the last few days. He has also been 

having loose stools. He drinks 1 L of vodka daily and has a significant 

withdrawal history.





History Information





- Allergies/Home Medication List


Allergies/Adverse Reactions: 








No Known Allergies Allergy (Verified 04/01/18 03:10)


 





I have personally reviewed and updated: family history, medical history





- Past Medical History


psychiatric history (PTSD, depression)





- Family History


Additional family history: Denies





- Social History


Smoking Status: Current every day smoker


Alcohol Use: Heavy (1 L vodka daily)





Review of Systems


Review of Systems: 





ROS: 10pt was reviewed & negative except for what was stated in HPI & below





Physical Exam


Physical Exam: 

















Temp Pulse Resp BP Pulse Ox


 


 36.7 C   103 H  16   134/94 H  91 L


 


 04/01/18 03:10  04/01/18 04:00  04/01/18 04:00  04/01/18 04:00  04/01/18 04:00











Constitutional: uncomfortable, unkempt


Eyes: PERRL, EOMI


Ears, Nose, Mouth, Throat: moist mucous membranes, no oral mucosal ulcers


Cardiovascular: regular rate and rhythym, no murmur, rub, or gallop


Respiratory: no respiratory distress, clear to auscultation


Gastrointestinal: normoactive bowel sounds, tenderness (Epi-gastric)


Skin: warm, normal color


Musculoskeletal: full muscle strength, no muscle tenderness


Neurologic: sensation intact bilaterally, No weakness


Psychiatric: depressed, flat affect





Lab Data & Imaging Review





 04/01/18 04:30





 04/01/18 04:30














WBC  6.85 10^3/uL (3.80-9.50)   04/01/18  04:30    


 


RBC  4.79 10^6/uL (4.40-6.38)   04/01/18  04:30    


 


Hgb  15.6 g/dL (13.7-17.5)   04/01/18  04:30    


 


Hct  45.8 % (40.0-51.0)   04/01/18  04:30    


 


MCV  95.6 fL (81.5-99.8)   04/01/18  04:30    


 


MCH  32.6 pg (27.9-34.1)   04/01/18  04:30    


 


MCHC  34.1 g/dL (32.4-36.7)   04/01/18  04:30    


 


RDW  15.5 % (11.5-15.2)  H  04/01/18  04:30    


 


Plt Count  104 10^3/uL (150-400)  L  04/01/18  04:30    


 


MPV  10.8 fL (8.7-11.7)   04/01/18  04:30    


 


Neut % (Auto)  78.9 % (39.3-74.2)  H  04/01/18  04:30    


 


Lymph % (Auto)  7.0 % (15.0-45.0)  L  04/01/18  04:30    


 


Mono % (Auto)  13.0 % (4.5-13.0)   04/01/18  04:30    


 


Eos % (Auto)  0.0 % (0.6-7.6)  L  04/01/18  04:30    


 


Baso % (Auto)  0.4 % (0.3-1.7)   04/01/18  04:30    


 


Nucleat RBC Rel Count  0.0 % (0.0-0.2)   04/01/18  04:30    


 


Absolute Neuts (auto)  5.40 10^3/uL (1.70-6.50)   04/01/18  04:30    


 


Absolute Lymphs (auto)  0.48 10^3/uL (1.00-3.00)  L  04/01/18  04:30    


 


Absolute Monos (auto)  0.89 10^3/uL (0.30-0.80)  H  04/01/18  04:30    


 


Absolute Eos (auto)  0.00 10^3/uL (0.03-0.40)  L  04/01/18  04:30    


 


Absolute Basos (auto)  0.03 10^3/uL (0.02-0.10)   04/01/18  04:30    


 


Absolute Nucleated RBC  0.00 10^3/uL (0-0.01)   04/01/18  04:30    


 


Immature Gran %  0.7 % (0.0-1.1)   04/01/18  04:30    


 


Immature Gran #  0.05 10^3/uL (0.00-0.10)   04/01/18  04:30    


 


Sodium  144 mEq/L (135-145)   04/01/18  04:30    


 


Potassium  4.7 mEq/L (3.5-5.2)   04/01/18  04:30    


 


Chloride  99 mEq/L ()   04/01/18  04:30    


 


Carbon Dioxide  16 mEq/l (22-31)  L D 04/01/18  04:30    


 


Anion Gap  29 mEq/L (8-16)  H  04/01/18  04:30    


 


BUN  15 mg/dL (7-23)   04/01/18  04:30    


 


Creatinine  0.5 mg/dL (0.7-1.3)  L  04/01/18  04:30    


 


Estimated GFR  > 60   04/01/18  04:30    


 


Glucose  57 mg/dL ()  L  04/01/18  04:30    


 


Calcium  9.2 mg/dL (8.5-10.4)   04/01/18  04:30    


 


Total Bilirubin  2.5 mg/dL (0.1-1.4)  H  04/01/18  04:30    


 


Conjugated Bilirubin  1.9 mg/dL (0.0-0.5)  H  04/01/18  04:30    


 


Unconjugated Bilirubin  0.6 mg/dL (0.0-1.1)   04/01/18  04:30    


 


AST  702 IU/L (17-59)  H  04/01/18  04:30    


 


ALT  315 IU/L (21-72)  H  04/01/18  04:30    


 


Alkaline Phosphatase  92 IU/L ()   04/01/18  04:30    


 


Total Protein  8.6 g/dL (6.3-8.2)  H  04/01/18  04:30    


 


Albumin  4.9 g/dL (3.5-5.0)   04/01/18  04:30    


 


Lipase  2681 IU/L ()  H  04/01/18  04:30    


 


Ethyl Alcohol  175 mg/dL (0-10)  H  04/01/18  04:30    











Assessment & Plan


Assessment: 








66 yo M  w/ PTSD and depression presents with alcoholic pancreatitis, 

hepatitis, and diarrhea.





Plan: 


1. Alcoholic pancreatitis - Abdominal pain, vomiting, inability to tolerate PO 

x2 days. Lipase 2681.


- Admit for observation


- NPO, mIVF, oxycodone, anti-emetics


- Advance diet as tolerated


2. ETOH abuse - High risk for withdrawal. Drinks 1 L vodka daily.


- [facility] protocol


- Thiamine, folate, MVI


3. Abnormal LFTs - Likely ETOH related, monitor LFTs.


4. AGMA - 2/2 ETOH and starvation ketosis. Continue fluid resuscitation.


5. Diarrhea - Possibly related to pancreatitis but will rule out infection w/ 

GI  PCR.





Diet - NPO, ADAT


Code - Full


Ppx - LMWH


Dispo - Admit under observation status

## 2018-04-02 LAB
INR PPP: 0.94 (ref 0.83–1.16)
PLATELET # BLD: (no result) 10^3/UL (ref 150–400)
PLATELET # BLD: (no result) 10^3/UL (ref 150–400)
PLATELET # BLD: 74 10^3/UL (ref 150–400)
PROTHROMBIN TIME: 12.8 SEC (ref 12–15)

## 2018-04-02 RX ADMIN — DEXTROSE MONOHYDRATE AND SODIUM CHLORIDE SCH MLS: 5; .45 INJECTION, SOLUTION INTRAVENOUS at 06:28

## 2018-04-02 RX ADMIN — Medication SCH APP: at 09:32

## 2018-04-02 RX ADMIN — Medication SCH APP: at 21:46

## 2018-04-02 RX ADMIN — THERA TABS SCH EACH: TAB at 09:32

## 2018-04-02 RX ADMIN — DEXTROSE MONOHYDRATE AND SODIUM CHLORIDE SCH MLS: 5; .9 INJECTION, SOLUTION INTRAVENOUS at 22:19

## 2018-04-02 RX ADMIN — DEXTROSE MONOHYDRATE AND SODIUM CHLORIDE SCH MLS: 5; .9 INJECTION, SOLUTION INTRAVENOUS at 10:55

## 2018-04-02 RX ADMIN — ONDANSETRON PRN MG: 2 SOLUTION INTRAMUSCULAR; INTRAVENOUS at 06:37

## 2018-04-02 RX ADMIN — ENOXAPARIN SODIUM SCH MG: 100 INJECTION SUBCUTANEOUS at 09:31

## 2018-04-02 RX ADMIN — THIAMINE HYDROCHLORIDE SCH MLS: 100 INJECTION, SOLUTION INTRAMUSCULAR; INTRAVENOUS at 09:31

## 2018-04-02 RX ADMIN — FOLIC ACID SCH MG: 1 TABLET ORAL at 09:32

## 2018-04-02 RX ADMIN — OXYCODONE HYDROCHLORIDE PRN MG: 15 TABLET ORAL at 06:37

## 2018-04-02 RX ADMIN — ONDANSETRON PRN MG: 2 SOLUTION INTRAMUSCULAR; INTRAVENOUS at 16:12

## 2018-04-02 NOTE — PDMN
Medical Necessity


Medical necessity: Change to IP, as of 4/2/18, per MD; los >2 mn for ongoing 

management of acute pancreatitis, hepatitis & thrombocytopenia likely r/t ETOH 

abuse; admit for further workup/monitoring, CIWA protocol, supportive care, IVFs

, CM consult & therapies; hx severe alcohol withdrawal; per progress note & 

order 4/2/18

## 2018-04-02 NOTE — HOSPPROG
Hospitalist Progress Note


Assessment/Plan: 





# acute pancreatitis- presumed 2/2 Etoh abuse - reporting improved pain and 

increased hunger - using PO pain meds


   oxygen saturations 95% on 1L - EKG (personally reviewed and interpreted) 

sinus tachycardia


   - continue IVF


   - continue pain 


   - advance from NPO to light diet





# EtOH abuse and w/d - hx severe w/d- no active tremor this morning


   - cont CIWA, 


   - continue thiamine





# hepatitis- 2/2 etOH- transaminases trending down 


   -continue supportive care





# thrombocytopenia - presumed secondary to alcohol related bone marrow 

suppression- platelets 74


# AGMA - resolved with IV fluids


# prophylaxis Lovenox


# diet advancing


# disposition greater than 2 midnights as the patient continues to withdraw 

from alcohol and needs ongoing care for acute pancreatitis





Discussed the case with the RN we will DC the [facility] today encourage p.o. If 

tolerated and DC IV fluids


Subjective: Abdominal pain improved


Objective: 


 Vital Signs











Temp Pulse Resp BP Pulse Ox


 


 36.5 C   96   16   146/76 H  93 


 


 04/02/18 12:00  04/02/18 12:00  04/02/18 12:00  04/02/18 12:00  04/02/18 12:00








 Laboratory Results





 04/02/18 09:20 





 04/02/18 09:20 





 











 04/01/18 04/02/18 04/03/18





 05:59 05:59 05:59


 


Intake Total  4930 


 


Output Total  1150 800


 


Balance  3780 -800








 











PT  12.8 SEC (12.0-15.0)   04/02/18  04:55    


 


INR  0.94  (0.83-1.16)   04/02/18  04:55    














- Physical Exam


Constitutional: unkempt


Eyes: anicteric sclera


Ears, Nose, Mouth, Throat: dry mucous membranes


Cardiovascular: regular rate and rhythym


Respiratory: no respiratory distress


Gastrointestinal: normoactive bowel sounds, tenderness, No guarding, No rebound


Genitourinary: no bladder fullness


Skin: warm


Musculoskeletal: No asymmetric calves


Neurologic: AAOx3


Psychiatric: flat affect


Lymph, Heme, Immunologic: no cervical LAD





ICD10 Worksheet


Patient Problems: 


 Problems











Problem Status Onset


 


Acute alcohol intoxication Acute  


 


Alcohol withdrawal Acute  


 


Alcoholic hepatitis Acute  


 


Alcoholic ketoacidosis Acute  


 


Pancreatitis Acute  


 


Alcoholic intoxication Acute  


 


Alcoholism Acute

## 2018-04-02 NOTE — ASMTCASEMG
Living Arrangements

 

What is your living           Answers:  Alone                                 

arrangement? Who do you                                                       

live with?                                                                    

Type Of Residence

 

What kind of residence do     Answers:  House                                 

you live in?                                                                  

Discharge Plan Comments

 

Coordination Status           

Comments                      

Notes:

Pt is a 68 y/o man admitted for alcoholic ketoacidosis and pancreatitis. Therapies have been 

ordered and awaiting recommendations. Needs are TBD at this time. CM to follow.







Plan: TBD

 

Date Signed:  04/02/2018 10:53 AM

Electronically Signed By:TERRY Reddy

## 2018-04-03 LAB — PLATELET # BLD: 75 10^3/UL (ref 150–400)

## 2018-04-03 RX ADMIN — FOLIC ACID SCH MG: 1 TABLET ORAL at 08:57

## 2018-04-03 RX ADMIN — DEXTROSE MONOHYDRATE AND SODIUM CHLORIDE SCH MLS: 5; .9 INJECTION, SOLUTION INTRAVENOUS at 05:45

## 2018-04-03 RX ADMIN — OXYCODONE HYDROCHLORIDE PRN MG: 15 TABLET ORAL at 05:43

## 2018-04-03 RX ADMIN — OXYCODONE HYDROCHLORIDE PRN MG: 15 TABLET ORAL at 22:30

## 2018-04-03 RX ADMIN — THERA TABS SCH EACH: TAB at 08:57

## 2018-04-03 RX ADMIN — THIAMINE HYDROCHLORIDE SCH MLS: 100 INJECTION, SOLUTION INTRAMUSCULAR; INTRAVENOUS at 08:57

## 2018-04-03 RX ADMIN — Medication SCH APP: at 20:49

## 2018-04-03 RX ADMIN — ENOXAPARIN SODIUM SCH MG: 100 INJECTION SUBCUTANEOUS at 08:57

## 2018-04-03 RX ADMIN — Medication SCH APP: at 08:57

## 2018-04-03 RX ADMIN — OXYCODONE HYDROCHLORIDE PRN MG: 15 TABLET ORAL at 14:12

## 2018-04-03 RX ADMIN — OXYCODONE HYDROCHLORIDE PRN MG: 15 TABLET ORAL at 18:33

## 2018-04-03 NOTE — HOSPPROG
Hospitalist Progress Note


Assessment/Plan: 





# acute pancreatitis- presumed 2/2 Etoh abuse - reporting improved pain and 

increased hunger - using PO pain meds


   oxygen saturations 94% on room air - EKG (personally reviewed and interpreted

) sinus tachycardia


   - dc IVF as successfully taking p.o.


   - continue pain meds po prn


   - advance to regular diet





# EtOH abuse and w/d - hx severe w/d- no active tremor this morning-still 

endorsing some auditory hallucinations


   - cont CIWA, 


   - continue thiamine


   - PT/OT eval for disposition





# hepatitis- 2/2 etOH- transaminases trending down 


   -continue supportive care





# thrombocytopenia - presumed secondary to alcohol related bone marrow 

suppression- platelets 75


# AGMA - resolved with IV fluids


# prophylaxis Lovenox


# diet advancing


# disposition greater than 2 midnights as the patient continues to withdraw 

from alcohol and needs ongoing care for acute pancreatitis





Discussed the case with the RN need PT/ OT evaluations for recommendations 

related to disposition


Subjective: Tolerating p.o.


Objective: 


 Vital Signs











Temp Pulse Resp BP Pulse Ox


 


 36.6 C   88   16   126/87 H  94 


 


 04/03/18 15:19  04/03/18 15:19  04/03/18 15:19  04/03/18 15:19  04/03/18 15:19








 Laboratory Results





 04/03/18 04:52 





 04/03/18 04:52 





 











 04/02/18 04/03/18 04/04/18





 05:59 05:59 05:59


 


Intake Total  1345 900


 


Output Total  2100 800


 


Balance  -755 100








 











PT  12.8 SEC (12.0-15.0)   04/02/18  04:55    


 


INR  0.94  (0.83-1.16)   04/02/18  04:55    














- Physical Exam


Constitutional: chronically ill appearing


Eyes: anicteric sclera


Ears, Nose, Mouth, Throat: dry mucous membranes


Cardiovascular: regular rate and rhythym, systolic murmur


Respiratory: no respiratory distress


Gastrointestinal: normoactive bowel sounds, No distension


Genitourinary: no bladder fullness


Skin: warm


Musculoskeletal: No asymmetric calves


Neurologic: AAOx3


Psychiatric: agitated


Lymph, Heme, Immunologic: no cervical LAD





ICD10 Worksheet


Patient Problems: 


 Problems











Problem Status Onset


 


Acute alcohol intoxication Acute  


 


Alcohol withdrawal Acute  


 


Alcoholic hepatitis Acute  


 


Alcoholic ketoacidosis Acute  


 


Pancreatitis Acute  


 


Alcoholic intoxication Acute  


 


Alcoholism Acute

## 2018-04-03 NOTE — ASMTCAGE
CAGE

 

Do you feel you ought to      Answers:  Yes                                   

cut down on your drinking                                                     

or drug use?                                                                  

Do people annoy you by        Answers:  No                                    

criticizing your drinking                                                     

or drug use?                                                                  

Do you feel guilty about      Answers:  Yes                                   

your drinking or drug                                                         

use?                                                                          

Do you drink or use drugs     Answers:  Yes                                   

first thing in the                                                            

morning (Eye Opener)?                                                         

 

Date Signed:  04/03/2018 10:32 AM

Electronically Signed By:TERRY Reddy

## 2018-04-03 NOTE — ASMTCMCOM
CM Note

 

CM Note                       

Notes:

CM spoke w/ Summer, PT regarding d/c POC. PT is recommending SNF at this time. CM met w/ pt for 

dispo planning. Pt is agreeable to going to SNF. Pt would like referrals made to 

[place], [place], and [facility] SNFs. CM completed non triggering PASRR and sent out 

referrals. [facility] [place] Pt reports that he does 

not have a . CM completed CAGE assessment. CM provided ETOH resources. Pt reports that 

he does want to stop his alcohol use. CM to follow.







Plan: SNF

 

Date Signed:  04/03/2018 10:32 AM

Electronically Signed By:TERRY Reddy

## 2018-04-03 NOTE — WOCRNPDOC
WOCRN Advanced Assessment Note





- Skin Integrity Problem, Advanced Assess





  ** Bilateral Buttock Denuded


Dressing Type: Open to Air


Exudate Amount: None


Integumentary Issue Intervention: Barrier Cream Applied (nursing to apply MAD 

cream)


Alicja Wound Tissue: Blanching, Intact


Alicja Wound Swelling: None


Wound Bed Color: Red


Wound Bed Constitution: Red/Pink - Non Granular Tissue


Skin Integrity Problem Comment: Raw, denuded skin throughout intergluteal cleft

, consistent w/ moisture-associated dermatitis. Scattered satellite lesions 

consistent w/ fungal involvement. Obtained verbal order from Dr. Hayes for 

topical clotrimazole/ lidocaine/zinc compound, to be applied BID. Wound RN will 

reassess Friday 4/6.

## 2018-04-04 RX ADMIN — ENOXAPARIN SODIUM SCH MG: 100 INJECTION SUBCUTANEOUS at 08:42

## 2018-04-04 RX ADMIN — OXYCODONE HYDROCHLORIDE PRN MG: 15 TABLET ORAL at 21:40

## 2018-04-04 RX ADMIN — OXYCODONE HYDROCHLORIDE PRN MG: 15 TABLET ORAL at 17:37

## 2018-04-04 RX ADMIN — VANCOMYCIN HYDROCHLORIDE SCH MG: KIT at 21:39

## 2018-04-04 RX ADMIN — FOLIC ACID SCH MG: 1 TABLET ORAL at 08:42

## 2018-04-04 RX ADMIN — OXYCODONE HYDROCHLORIDE PRN MG: 15 TABLET ORAL at 03:51

## 2018-04-04 RX ADMIN — Medication SCH APP: at 08:32

## 2018-04-04 RX ADMIN — CIPROFLOXACIN SCH: 2 INJECTION, SOLUTION INTRAVENOUS at 22:01

## 2018-04-04 RX ADMIN — CIPROFLOXACIN SCH MLS: 2 INJECTION, SOLUTION INTRAVENOUS at 21:41

## 2018-04-04 RX ADMIN — THERA TABS SCH EACH: TAB at 08:42

## 2018-04-04 RX ADMIN — OXYCODONE HYDROCHLORIDE PRN MG: 15 TABLET ORAL at 12:46

## 2018-04-04 RX ADMIN — CIPROFLOXACIN SCH MLS: 2 INJECTION, SOLUTION INTRAVENOUS at 08:30

## 2018-04-04 RX ADMIN — Medication SCH APP: at 21:57

## 2018-04-04 RX ADMIN — OXYCODONE HYDROCHLORIDE PRN MG: 15 TABLET ORAL at 08:53

## 2018-04-04 RX ADMIN — VANCOMYCIN HYDROCHLORIDE SCH MG: KIT at 12:47

## 2018-04-04 RX ADMIN — Medication SCH MG: at 08:42

## 2018-04-04 RX ADMIN — VANCOMYCIN HYDROCHLORIDE SCH MG: KIT at 16:39

## 2018-04-04 NOTE — ASMTCMCOM
CM Note

 

CM Note                       

Notes:

Spoke with pt re; dc snf. Pt would like to stay in [place] and chooses [facility] but would like to 


speak to Jolene paiz able to visit with pt Thursday morning, DAYNA w/f.



DC Plan: SNF

 

Date Signed:  04/04/2018 03:51 PM

Electronically Signed By:Sofy Amador RN

## 2018-04-04 NOTE — HOSPPROG
Hospitalist Progress Note


Assessment/Plan: 





*  Infectious colitis - Cdiff vs. Shigella


   -Cipro + PO Vanco


*  Possible Etoh pancreatitis


   -diet advanced


*  Etoh withdrawal


   -wean off benzos


*  Etoh hepatitis


   -follow LFT











Subjective: Tolerating PO, diarrhea improved


Objective: 


 Vital Signs











Temp Pulse Resp BP Pulse Ox


 


 36.3 C   96   16   118/85 H  92 


 


 04/04/18 15:52  04/04/18 15:52  04/04/18 15:52  04/04/18 15:52  04/04/18 15:52








 Laboratory Results





 04/03/18 04:52 





 04/04/18 04:38 





 











 04/03/18 04/04/18 04/05/18





 05:59 05:59 05:59


 


Intake Total 1345 1050 


 


Output Total 2100 1800 250


 


Balance -755 -750 -250








 











PT  12.8 SEC (12.0-15.0)   04/02/18  04:55    


 


INR  0.94  (0.83-1.16)   04/02/18  04:55    














- Physical Exam


Constitutional: no apparent distress, appears nourished, not in pain


Cardiovascular: regular rate and rhythym, no murmur, rub, or gallop


Respiratory: no respiratory distress, no rales or rhonchi, clear to auscultation


Gastrointestinal: normoactive bowel sounds, soft, non-tender abdomen, no 

palpable masses


Skin: no rashes or abrasions, no fluctuance, no induration


Neurologic: AAOx3, sensation intact bilaterally


Psychiatric: interacting appropriately, not anxious, not encephalopathic, 

thought process linear





ICD10 Worksheet


Patient Problems: 


 Problems











Problem Status Onset


 


Acute alcohol intoxication Acute  


 


Alcohol withdrawal Acute  


 


Alcoholic hepatitis Acute  


 


Alcoholic ketoacidosis Acute  


 


Pancreatitis Acute  


 


Alcoholic intoxication Acute  


 


Alcoholism Acute

## 2018-04-05 VITALS
OXYGEN SATURATION: 94 % | DIASTOLIC BLOOD PRESSURE: 90 MMHG | SYSTOLIC BLOOD PRESSURE: 133 MMHG | TEMPERATURE: 97.5 F | RESPIRATION RATE: 18 BRPM | HEART RATE: 99 BPM

## 2018-04-05 LAB — PLATELET # BLD: 114 10^3/UL (ref 150–400)

## 2018-04-05 RX ADMIN — FOLIC ACID SCH MG: 1 TABLET ORAL at 08:22

## 2018-04-05 RX ADMIN — VANCOMYCIN HYDROCHLORIDE SCH MG: KIT at 05:50

## 2018-04-05 RX ADMIN — OXYCODONE HYDROCHLORIDE PRN MG: 15 TABLET ORAL at 10:13

## 2018-04-05 RX ADMIN — OXYCODONE HYDROCHLORIDE PRN MG: 15 TABLET ORAL at 01:28

## 2018-04-05 RX ADMIN — ENOXAPARIN SODIUM SCH MG: 100 INJECTION SUBCUTANEOUS at 08:30

## 2018-04-05 RX ADMIN — THERA TABS SCH EACH: TAB at 08:22

## 2018-04-05 RX ADMIN — VANCOMYCIN HYDROCHLORIDE SCH MG: KIT at 12:06

## 2018-04-05 RX ADMIN — OXYCODONE HYDROCHLORIDE PRN MG: 15 TABLET ORAL at 05:51

## 2018-04-05 RX ADMIN — Medication SCH MG: at 08:22

## 2018-04-05 RX ADMIN — Medication SCH APP: at 08:40

## 2018-04-05 NOTE — GDS
[f rep st]



                                                             DISCHARGE SUMMARY





DISCHARGE DIAGNOSES:  

1.  Infectious colitis, possible Shigella and Clostridium difficile.

2.  Possible alcohol pancreatitis.

3.  Alcohol withdrawal.

4.  Alcohol hepatitis.

5.  Chronic back pain.



HISTORY:  The patient is a 67-year-old male, [facility], presenting with abdo
leslie pain.  Initially, he was thought to have possible alcohol pancreatitis as he was drinking heavi
ly, and he had a mildly elevated lipase.  Eventually, he was noticed to have diarrhea, and stool GI P
CR came back positive for both C diff and Shigella.  It is unclear if he has an infectious colitis as
 a cause of his abdominal pain versus alcohol pancreatitis.  He was started on oral ciprofloxacin and
 oral vancomycin, and he has improved.  We have been able to advance his diet. 



The patient is going to [facility] for rehabilitation.  We have successfully weaned him off alcohol d
uring this hospitalization.  He would like to quit alcohol; however, he has been using it for pain an
d anxiety management.  He has chronic back pain, as well as an obvious significant anxiety disorder. 
 He is no longer withdrawing from alcohol but is still using Ativan for extreme anxiety.  He does not
 feel he will be able to transfer to [facility] without these medications in place.  I have spoken wi
[facility], and they do have a psychiatry consultation service there.  I recommend they start him 
on a medication regimen for the anxiety, and then his benzodiazepines can be weaned off.  He will als
o need to have his chronic pain addressed and wean off the oxycodone as well.



DISCHARGE MEDICATIONS:  Please see computerized record for full detailed list.  New medications:

1.  Ciprofloxacin 500 mg p.o. b.i.d. 

2.  Vancomycin 125 mg mg p.o. 4 times a day.

3.  Melatonin 3 mg p.o. q.h.s. 

4.  Nicotine patch 21 mg transdermal daily.

5.  Ativan 1 mg p.o. t.i.d. as needed.

6.  Oxycodone 5 mg p.o. q.6 hours as needed.



ADDITIONAL DISCHARGE INSTRUCTIONS:  

1.  Stop date for ciprofloxacin April 12.  Stop day for oral vancomycin April 19.

2.  Recommend weaning off oxycodone and Ativan prior to discharging from skilled nursing facility.

3.  Consultation with [facility] psychiatry team for anxiety management.

4.   

Greater than 30 minutes' time was spent arranging this discharge.  Patient seen and examined by me on
 the day of discharge.





Job #:  [id number]

## 2018-04-05 NOTE — ASDISCHSUM
----------------------------------------------

Discharge Information

----------------------------------------------

Plan Status:SNF                                      Medically Cleared to Leave:

Discharge Date:04/05/2018 01:20 PM                   CM D/C Disposition:Skilled Nursing Facility

ADT D/C Disposition:Skilled Nursing Facility         Projected Discharge Date:04/05/2018 03:00 PM

Transportation at D/C:Wheelchair Van                 Discharge Delay Reason:

Follow-Up Date:04/05/2018 03:00 PM                   Discharge Slot:

Final Diagnosis:

----------------------------------------------

Placement Information

----------------------------------------------

Referral Type:*Nursing Home/SNF                      Referral ID:[id number]

Provider Name:[facility]

Address 1:[address]                             Phone [phone]

Address 2:                                           Fax [phone]

City:[place]                                         Selection Factors:

State:[place]

 

----------------------------------------------

Patient Contact Information

----------------------------------------------

Contact Name:KLAUDIA                               Relationship:

Address:                                             Home Phone:

                                                     Work Phone:

City:                                                [facility] Phone:

State/[place] Code:                                      Email:

----------------------------------------------

Financial Information

----------------------------------------------

Financial Class:Medicare

Primary Plan Desc:MEDICARE INPATIENT                 Primary Plan Number:[id number]

Secondary Plan Desc:                                 Secondary Plan Number:

 

 

----------------------------------------------

Assessment Information

----------------------------------------------

----------------------------------------------

LACE

----------------------------------------------

LACE

 

Length of stay for            Answers:  3 days                                

current admission                                                             

Acuity / Level of             Answers:  Yes                                   

Care: Did the patient                                                         

have an inpatient                                                             

admission?                                                                    

Comorbidities - select        Answers:  Opioid dependence                     

all that apply                          / Chronic pain                        

# of Emergency department     Answers:  9-12                                  

visits in the last 6                                                          

months                                                                        

Social determinants           Answers:  History of substance                  

                                        abuse (ETOH, street                   

                                        drugs, prescription                   

                                        drugs, etc.)                          

                                        Mental health diagnosis               

                                        (anxiety, depression, pers            

                                        onality disorders, etc.)              

Score: 21

 

Date Signed:  04/05/2018 10:20 AM

Electronically Signed By:Sofy Amador RN

 

 

----------------------------------------------

[facility] Initial CM Assessment

----------------------------------------------

Living Arrangements

 

What is your living           Answers:  Alone                                 

arrangement? Who do you                                                       

live with?                                                                    

Type Of Residence

 

What kind of residence do     Answers:  House                                 

you live in?                                                                  

Discharge Plan Comments

 

Coordination Status           

Comments                      

Notes:

Pt is a 68 y/o man admitted for alcoholic ketoacidosis and pancreatitis. Therapies have been 

ordered and awaiting recommendations. Needs are TBD at this time. CM to follow.







Plan: TBD

 

Date Signed:  04/02/2018 10:53 AM

Electronically Signed By:TERRY Reddy

 

 

----------------------------------------------

[facility] Progress Note

----------------------------------------------

CM Note

 

CM Note                       

Notes:

CM spoke w/ Summer, PT regarding d/c POC. PT is recommending SNF at this time. CM met w/ pt for 

dispo planning. Pt is agreeable to going to SNF. Pt would like referrals made to 

[place], [place], and [facility] SNFs. CM completed non triggering PASRR and sent out 

referrals. Pt reports that [facility] is a  [facility] [place] Pt reports that he does 

not have a . CM completed CAGE assessment. CM provided ETOH resources. Pt reports that 

he does want to stop his alcohol use. CM to follow.







Plan: SNF

 

Date Signed:  04/03/2018 10:32 AM

Electronically Signed By:TERRY Reddy

 

 

----------------------------------------------

CAGE Questionnaire

----------------------------------------------

CAGE

 

Do you feel you ought to      Answers:  Yes                                   

cut down on your drinking                                                     

or drug use?                                                                  

Do people annoy you by        Answers:  No                                    

criticizing your drinking                                                     

or drug use?                                                                  

Do you feel guilty about      Answers:  Yes                                   

your drinking or drug                                                         

use?                                                                          

Do you drink or use drugs     Answers:  Yes                                   

first thing in the                                                            

morning (Eye Opener)?                                                         

 

Date Signed:  04/03/2018 10:32 AM

Electronically Signed By:TERRY Reddy

 

 

----------------------------------------------

[facility] CM Progress Note

----------------------------------------------

CM Note

 

CM Note                       

Notes:

Spoke with pt re; mar[facility]. Pt would like to stay in [place] and chooses [facility] but would like to 


speak to Jolene paiz able to visit with pt Thursday morning, DAYNA w/fNahid



DC Plan: SNF

 

Date Signed:  04/04/2018 03:51 PM

Electronically Signed By:Sofy Amador RN

 

 

----------------------------------------------

Case Management Discharge Plan Note

----------------------------------------------

Case Management Discharge

 

Discharge Order Complete?     Answers:  Yes                                   

Patient to Obtain             Answers:  Other                         Notes:  [facility]

Medications                                                                   

Transportation Arranged       Answers:  Other                         Notes:  [facility]

Transport will Pick (Date     04/05/2018 01:00 PM

& Time)                       

Faxed Final Orders            Answers:  Yes                                   

Agency/Facility Transfer      Answers:  Yes                                   

Report Printed & Faxed to                                                     

Receiving Agency                                                              

Discharge Comments            

Notes:

D/rajiv MCCARTHY, final orders faxed. Jolene at  [facility] [facility] to call report

 

Date Signed:  04/05/2018 10:32 AM

Electronically Signed By:Sofy Amador RN

 

 

----------------------------------------------

Intervention Information

----------------------------------------------

Intervention Type:MOON-Not Delivered                 Date of Service:04/02/2018 11:29 AM

Patient Type:Observation                             Staff Member:Mariposa Martinez

Hours:                                               Discipline:

Severity:                                            Comment:Attempted to meet with patient twice. 
He was 

                                                              unable to be woken during both visits.

## 2018-04-05 NOTE — PDIAF
- Diagnosis


Diagnosis: infectous colitis - Shigella and Cdiff


Code Status: Full Code





- Medication Management


Discharge Medications: 


 Medications to Continue on Transfer





oxyCODONE IR [Oxycodone Ir (*)] 5 mg PO Q6 PRN 04/01/18 [Last Taken Unknown]


Ciprofloxacin [Cipro] 500 mg PO BID@1000,2000 #20 tab 04/05/18 [Last Taken 

Unknown]


LORazepam [Ativan (*)] 1 mg PO TID PRN #20 tab 04/05/18 [Last Taken Unknown]


Melatonin [Melatonin 3 MG (*)] 3 mg PO HS #30 tab 04/05/18 [Last Taken Unknown]


Nicotine [Nicotine Patch] 21 mg TD DAILY #30 patch.td24 04/05/18 [Last Taken 

Unknown]


Vancomycin [Vancocin Oral Liquid] 125 mg PO QID #40 udl 04/05/18 [Last Taken 

Unknown]








Long Term Antibiotic Stop Date: 04/12/18


Additional Medication Instructions: Stop Cipro 4/12,   Stop PO vanco 4/19.  

Recommend wean off oxycodone and ativan prior to discharge from SNF


Discharge Medications: Refer to the Discharge Home Medication list for PRN 

reason.





- Orders


Services needed: Master , Physical Therapy, Occupational Therapy


Isolation Type: None


Diet Recommendation: no restrictions on diet


Additional: Recommend consultation with facility pyschiatry team for anxiety 

management (he was previously self medicating with alcohol)





- Follow Up Care


Current Providers and Referrals: 


NONE *PRIMARY CARE P,. [Primary Care Provider] - As per Instructions

## 2018-04-06 ENCOUNTER — HOSPITAL ENCOUNTER (EMERGENCY)
Dept: HOSPITAL 80 - FED | Age: 68
Discharge: HOME | End: 2018-04-06
Payer: COMMERCIAL

## 2018-04-06 VITALS — DIASTOLIC BLOOD PRESSURE: 71 MMHG | SYSTOLIC BLOOD PRESSURE: 99 MMHG

## 2018-04-06 DIAGNOSIS — Z86.73: ICD-10-CM

## 2018-04-06 DIAGNOSIS — F17.200: ICD-10-CM

## 2018-04-06 DIAGNOSIS — A04.72: Primary | ICD-10-CM

## 2018-04-06 NOTE — ASMTCMCOM
CM Note

 

CM Note                       

Notes:

Patient presents to triage (ER) via cab from [facility] where he evidently left "AMA" this 

morning.  Chart reviewed, met with patient, and discussed with Jolene Craig at .



Patient was discharged from [facility] yesterday to [facility].  Please see both CM discharge 

planning notes and D/C summary (Dr. Chinchilla) from 04/05/18.



Patient tells me that he would like to get prescriptions for his oxycodone and anxiety 

medication.  He admits to "signing out" from [facility] prior to his arrival to the ER.  He 

explains that "they" were not giving him his medication as prescribed and that he would like to get 


his medication and go home.  I explained to patient that we would likely be able to provide 

prescriptions for his current antibiotcs (Vanco and Cipro), but that he would need to follow up 

with his PCP for any narcotic/anxiety medication.  Patient initially tells me that he [facility] 

[facility], but then states that he does not have an established 

provider at s time.



 My follow up with Jolene Craig at  confirms that patient did leave AMA this morning.  Per 

Jolene, patient was verbally abusive, hostile, and upset about his medication schedule.  Jolene also 

informs me that the plan was to [facility] to the [facility] today, with the patient, in attempt to help him 


esatblish a PCP.



I did inform patient of this conversation to which he responded, "The [facility] doesn't help.  I have 

[facility] the [facility] in [place] and they are a joke".  Patient tells me that he will take his 

antibiotic prescriptions and a cab to go home.  I have confirmed his address, called [facility] on his 

behalf, and patient was discharged with his prescriptions for Vancomycin and Cipro.  He assures me 

he will have the cab stop at the [facility] pharmacy to drop off his prescriptions on the way home.  I 


discussed the importance of taking all of his medications, diligent handwashing, etc.  I also 

encouraged patient to consider establishing a PCP wherever he is comfortable doing so in order to 

have medication and health care management.  In addition, I asked him to consider looking into [facility] 

for potential help with anxiety, behavioral health, and case management. 

 

Date Signed:  04/06/2018 11:06 AM

Electronically Signed By:Mili Reaves RN

## 2018-04-06 NOTE — EDPHY
H & P


Stated Complaint: "I have Cdiff"


Time Seen by Provider: 04/06/18 09:35


HPI/ROS: 





CHIEF COMPLAINT: "I have C diff"





HISTORY OF PRESENT ILLNESS:  67-year-old male discharged from hospital 

yesterday for infectious colitis positive Shigella and C diff, started on 

vancomycin ciprofloxacin, currently residing at [facility] arrives via taxi cab 

stating that he would like to be readmitted or sent to the [facility] because the pills he is taking are too small and/or 

ineffective, although states that he is unhappy with the care he is receiving 

at [facility].  He denies acute complaints of pain or discomfort.  He denies:  

Chest pain, abdominal pain, nausea, vomiting.  He is able tolerate oral intake.

  Has been able to tolerate his medications orally.








REVIEW OF SYSTEMS:


A ten point review of systems was performed and is negative with the exception 

of the items mentioned in the HPI








PAST MEDICAL & SURGICAL  HISTORY:  History of alcoholism.  Alcoholic hepatitis.

  Chronic back pain.  Possible alcohol pancreatitis history.  Current C diff 

and Shigella colitis.





SOCIAL HISTORY:  Daily smoker.  Current living in [facility].   














************


PHYSICAL EXAM





(Prior to examination, patient consented to physical exam, hands were washed 

and my usual and customary physical exam procedures followed)


1) GENERAL: Well-developed, well-nourished, alert and oriented.  Appears to be 

in no acute distress.


2) HEAD: Normocephalic, atraumatic


3) HEENT: Pupils equal, round, reactive to light bilaterally.  Sclera 

anicteric.  Nasopharynx, oropharynx, clear, no lesions.  Moist mucous membranes


4) NECK: Full range of motion, no meningeal signs.


5) LUNGS: Clear auscultation bilaterally, no wheezes, no rhonchi, no 

retractions.   


6) HEART: Regular rate and rhythm, no murmur, no heave, no gallop.


7) ABDOMEN: No guarding, no rebound, no focal tenderness, negative McBurney's, 

negative Clark's, negative Rovsing's, negative peritoneal sign,


8) MUSCULOSKELETAL: Moving all extremities, no focal areas of tenderness, no 

obvious trauma.  No peripheral edema or discoloration.


9) BACK: No CVA tenderness, no midline vertebral tenderness, no fluctuance, no 

step-off, no obvious trauma, no visual or palpable abnormality. 


10) SKIN: No rash, no petechiae. 


11) Psychiatric:  Patient is oriented X 3, there is no agitation.








***************





DIFFERENTIAL DIAGNOSIS:   In no particular include but limited to C diff colitis

, acute alcohol withdrawal, vomiting








- Personal History


Current Tetanus Diphtheria and Acellular Pertussis (TDAP): Yes





- Medical/Surgical History


Hx Asthma: No


Hx Chronic Respiratory Disease: Yes


Hx Diabetes: No


Hx Cardiac Disease: No


Hx Renal Disease: No


Hx Cirrhosis: No


Hx Alcoholism: Yes


Hx HIV/AIDS: No


Hx Splenectomy or Spleen Trauma: No


Other PMH: ETOH and opiate abuse, chronic back and shoulder pain, R rotator 

cuff surgery x 2 PTSD, CVA, emphysema. PNA





- Social History


Smoking Status: Current every day smoker


Constitutional: 


 Initial Vital Signs











Temperature (C)  36.7 C   04/06/18 09:24


 


Heart Rate  129 H  04/06/18 09:24


 


Respiratory Rate  20   04/06/18 09:24


 


Blood Pressure  99/71 L  04/06/18 09:24


 


O2 Sat (%)  93   04/06/18 09:24








 











O2 Delivery Mode               Room Air














Allergies/Adverse Reactions: 


 





No Known Allergies Allergy (Verified 04/01/18 03:10)


 








Home Medications: 














 Medication  Instructions  Recorded


 


oxyCODONE IR [Oxycodone Ir (*)] 5 mg PO Q6 PRN 04/01/18


 


Ciprofloxacin [Cipro] 500 mg PO BID@1000,2000 #20 tab 04/05/18


 


LORazepam [Ativan (*)] 1 mg PO TID PRN #20 tab 04/05/18


 


Melatonin [Melatonin 3 MG (*)] 3 mg PO HS #30 tab 04/05/18


 


Nicotine [Nicotine Patch] 21 mg TD DAILY #30 patch.td24 04/05/18


 


Vancomycin [Vancocin Oral Liquid] 125 mg PO QID #40 udl 04/05/18


 


Ciprofloxacin HCl [Ciprofloxacin] 500 mg PO BID #12 tab 04/06/18


 


Vancomycin [Vancomycin (*)] 125 mg PO Q6 #52 cap 04/06/18














Medical Decision Making


ED Course/Re-evaluation: 





10:01 a.m.:  I have reviewed this patient's old medical records including his 

case management discharge notes.  He is currently on a regimen of vancomycin 

and ciprofloxacin.  At this time I do not identify emergent condition requiring 

diagnostic studies or hospitalization.  He would like us to transfer him to 

[facility] stating that he is not likely care is 

receiving in [facility].  He is noted to be tachycardic.  He does not want 

further intervention from the ER.  He would like to speak with the case 

manager.  I believe him to have decision-making capacity.  Care of patient 

under supervision of secondary supervising physician Dr Senior with whom I 

discussed case. 





10:19 a.m.:   has spoke with the patient.  He is progressively 

escalating in emergency department informed the  that he wants 

prescription for OxyContin and benzodiazepines and that he voluntarily signed 

out of [facility] this morning and sent have his medications anymore.  This is 

new information.





10:30 a.m.:   has spoke with [facility] as confirmed that the 

patient signed out of the facility this morning visibly upset, left his 

medications there.  I have agreed to given a prescription for ciprofloxacin to 

last until April 12th and vancomycin to last until April 19 for his Shigella 

and C diff colitis.  This is based on the discharge summary instructions and 

information.  The patient  Is repeatedly requested prescription for opiates and 

benzos.  I have declined this request.  He Continues to smoke cigarettes.  I 

counseled the patient for greater than 3 min regarding smoking cessation.  

Patient is noted to be tachycardic.  He will not allow emergency department 

staff myself for nursing staff to perform repeat vital signs.  He is visibly 

upset.  I believe him to have decision-making capacity.





Departure





- Departure


Disposition: Home, Routine, Self-Care


Clinical Impression: 


 C. difficile colitis





Condition: Good


Instructions:  Clostridium Difficile Infection (ED), Infectious Colitis (ED)


Additional Instructions: 


You have checked out of [facility] voluntarily.  I am writing a prescription 

for vancomycin and ciprofloxacin.  Further medications will be need to be 

obtained from the [facility].  You have declined further 

evaluation.  You were noted to have a fast heart rate.  Please return to the ER 

if you would like to have further evaluation.


Referrals: 


Follow-up, at the [facility] in 2 day [Other] - As per 

Instructions


Prescriptions: 


Ciprofloxacin HCl [Ciprofloxacin] 500 mg PO BID #12 tab


Vancomycin [Vancomycin (*)] 125 mg PO Q6 #52 cap

## 2018-04-27 ENCOUNTER — HOSPITAL ENCOUNTER (EMERGENCY)
Dept: HOSPITAL 80 - FED | Age: 68
LOS: 1 days | Discharge: HOME | End: 2018-04-28
Payer: COMMERCIAL

## 2018-04-27 DIAGNOSIS — F17.200: ICD-10-CM

## 2018-04-27 DIAGNOSIS — F10.929: Primary | ICD-10-CM

## 2018-04-27 DIAGNOSIS — Z86.73: ICD-10-CM

## 2018-04-27 LAB — PLATELET # BLD: 199 10^3/UL (ref 150–400)

## 2018-04-27 PROCEDURE — 96374 THER/PROPH/DIAG INJ IV PUSH: CPT

## 2018-04-27 PROCEDURE — 96372 THER/PROPH/DIAG INJ SC/IM: CPT

## 2018-04-27 PROCEDURE — 99285 EMERGENCY DEPT VISIT HI MDM: CPT

## 2018-04-27 PROCEDURE — G0480 DRUG TEST DEF 1-7 CLASSES: HCPCS

## 2018-04-27 NOTE — EDPHY
H & P





- Medical/Surgical History


Hx Asthma: No


Hx Chronic Respiratory Disease: Yes


Hx Diabetes: No


Hx Cardiac Disease: No


Hx Renal Disease: No


Hx Cirrhosis: No


Hx Alcoholism: Yes


Hx HIV/AIDS: No


Hx Splenectomy or Spleen Trauma: No


Other PMH: ETOH and opiate abuse, chronic back and shoulder pain, R rotator 

cuff surgery x 2 PTSD, CVA, emphysema. PNA





- Social History


Smoking Status: Current every day smoker


Time Seen by Provider: 04/27/18 19:49


HPI/ROS: 





CHIEF COMPLAINT: "Fuck you you piece of shit"





HISTORY OF PRESENT ILLNESS: 68-year-old male arrives via police on an M1 hold, 

handcuffed after he called 911 threatening suicidal ideations and since has 

been threatening physical harm, yelling, cursing .  Per police upon arrival he 

stated that he "just wanted to talk to someone" and was feeling suicidal.  His 

behavior however then quickly escalated.  No complaints of pain or discomfort.  

No trauma.








REVIEW OF SYSTEMS:


A ten point review of systems was performed and is negative with the exception 

of the items mentioned in the HPI








PAST MEDICAL & SURGICAL  HISTORY:  Patient's medical history significant for C 

diff colitis, admitted to the hospital earlier this month and discharged, went 

to [facility] and subsequently self discharged from [facility] because he was 

unhappy with the care is receiving there





SOCIAL HISTORY: Admits to positive alcohol use    














************


PHYSICAL EXAM





(Prior to examination, patient consented to physical exam, hands were washed 

and my usual and customary physical exam procedures followed)


1) GENERAL: Well-developed, well-nourished, alert and oriented.  Yelling, 

cursing, threatening staff, exhibiting physically aggressive behavior.


2) HEAD: Normocephalic, atraumatic


3) HEENT: Pupils equal, round, reactive to light bilaterally.  Sclera 

anicteric. 


4) NECK: Full range of motion, no meningeal signs.


5) LUNGS: Clear auscultation bilaterally, no wheezes, no rhonchi, no 

retractions.   


6) HEART: Regular rate and rhythm, no murmur, no heave, no gallop.


7) ABDOMEN: No guarding, no rebound, no focal tenderness,


8) MUSCULOSKELETAL: Moving all extremities, no focal areas of tenderness, no 

obvious trauma.  No peripheral edema or discoloration.


9) BACK: No obvious trauma, no visual or palpable abnormality. 


10) SKIN: No rash, no petechiae. 


11) Psychiatric:  Patient is oriented X 3, there is no agitation.








***************





DIFFERENTIAL DIAGNOSIS:   In no particular include but limited to polysubstance 

abuse, acute intoxication, depression, suicidal ideation, homicidal ideation, 

psychosis


 (Rita,LIBRA Martine)


Constitutional: 





 Initial Vital Signs











Temperature (C)  36.8 C   04/27/18 19:55


 


Heart Rate  89   04/27/18 19:55


 


Respiratory Rate  20   04/27/18 19:55


 


Blood Pressure  153/94 H  04/27/18 19:55


 


O2 Sat (%)  93   04/27/18 19:55








 











O2 Delivery Mode               Room Air














Allergies/Adverse Reactions: 


 





No Known Allergies Allergy (Verified 04/01/18 03:10)


 








Home Medications: 














 Medication  Instructions  Recorded


 


oxyCODONE IR [Oxycodone Ir (*)] 5 mg PO Q6 PRN 04/01/18


 


Ciprofloxacin [Cipro] 500 mg PO BID@1000,2000 #20 tab 04/05/18


 


LORazepam [Ativan (*)] 1 mg PO TID PRN #20 tab 04/05/18


 


Melatonin [Melatonin 3 MG (*)] 3 mg PO HS #30 tab 04/05/18


 


Nicotine [Nicotine Patch] 21 mg TD DAILY #30 patch.td24 04/05/18


 


Vancomycin [Vancocin Oral Liquid] 125 mg PO QID #40 udl 04/05/18


 


Ciprofloxacin HCl [Ciprofloxacin] 500 mg PO BID #12 tab 04/06/18


 


Vancomycin [Vancomycin (*)] 125 mg PO Q6 #52 cap 04/06/18














Medical Decision Making


ED Course/Re-evaluation: 





7:49 p.m.:  .  Greeted on arrival by myself and Dr. Clay Mosqueda.  At this time 

as patient presents an imminent danger to himself and to staff and to other 

patients in the emergency department, he is yelling, cursing, threatening 

physical harm, is not safe to remove him from handcuffs at this time.  He will 

be given pharmacologic intervention.





8:27 p.m.:  Patient remains agitated, is not safe to take him out of restraints

, he remains cursing, aggressive threatening assault





2:00 a.m.: Care turned over to Dr Parra. Mental health evaluation pending.  (

LIBRA Salinas)





7:30 a.m.-


Patient continues to await mental health evaluation and urinalysis.  The case 

will be signed out to the oncoming provider Dr. Waters (AidaSeema)


Other Provider: 





I assumed care of this patient at 7 o'clock from Dr. Beavers.  At 9 o'clock 

patient had received a mental health evaluation.  They believe that the patient 

is no longer meeting criteria for mental health hold.  He reports no suicidal 

ideation.  He states he is very sleepy.  He declines admission to the [facility]. (

Renetta Sepulveda)





- Data Points


Laboratory Results: 





 Laboratory Results





 04/27/18 20:47 





 04/27/18 20:47 








Medications Given: 





 





Haloperidol Lactate (Haldol Injection)  10 mg IM Q6HRS PRN


   PRN Reason: Agitation


   Stop: 10/24/18 19:54


   Last Admin: 04/27/18 19:55 Dose:  10 mg





Discontinued Medications





Haloperidol Lactate (Haldol Injection)  10 mg IVP EDNOW ONE


   Stop: 04/27/18 20:29


   Last Admin: 04/27/18 21:14 Dose:  10 mg


Lorazepam (Ativan Injection)  2 mg IM EDNOW ONE


   Stop: 04/27/18 19:56


   Last Admin: 04/27/18 19:55 Dose:  2 mg


Lorazepam (Ativan Injection)  2 mg IM EDNOW ONE


   Stop: 04/27/18 20:30


   Last Admin: 04/27/18 21:14 Dose:  2 mg


Olanzapine (Zyprexa Zydis)  5 mg PO EDNOW ONE


   Stop: 04/27/18 21:39


   Last Admin: 04/27/18 22:10 Dose:  5 mg








Departure





- Departure


Clinical Impression: 


Acute alcohol intoxication


Qualifiers:


 Complication of substance-induced condition: uncomplicated Qualified Code(s): 

F10.929 - Alcohol use, unspecified with intoxication, unspecified





Referrals: 


Patient,NotPresent [Primary Care Provider] - As per Instructions

## 2018-04-28 VITALS — DIASTOLIC BLOOD PRESSURE: 81 MMHG | SYSTOLIC BLOOD PRESSURE: 129 MMHG

## 2018-05-27 ENCOUNTER — HOSPITAL ENCOUNTER (INPATIENT)
Dept: HOSPITAL 80 - FED | Age: 68
LOS: 2 days | Discharge: LEFT BEFORE DISCHARGE | DRG: 372 | End: 2018-05-29
Attending: INTERNAL MEDICINE | Admitting: INTERNAL MEDICINE
Payer: COMMERCIAL
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
## 2018-05-27 DIAGNOSIS — E86.9: ICD-10-CM

## 2018-05-27 DIAGNOSIS — F10.10: ICD-10-CM

## 2018-05-27 DIAGNOSIS — Z72.0: ICD-10-CM

## 2018-05-27 DIAGNOSIS — F43.10: ICD-10-CM

## 2018-05-27 DIAGNOSIS — E83.39: ICD-10-CM

## 2018-05-27 DIAGNOSIS — E16.2: ICD-10-CM

## 2018-05-27 DIAGNOSIS — E87.2: ICD-10-CM

## 2018-05-27 DIAGNOSIS — A04.71: Primary | ICD-10-CM

## 2018-05-27 DIAGNOSIS — G89.29: ICD-10-CM

## 2018-05-27 LAB — PLATELET # BLD: 145 10^3/UL (ref 150–400)

## 2018-05-27 PROCEDURE — G0480 DRUG TEST DEF 1-7 CLASSES: HCPCS

## 2018-05-27 NOTE — PDGENHP
History and Physical





- Chief Complaint


Vomiting, diarrhea





- History of Present Illness


69 yo M w/ hx of ETOH abuse, chronic back pain, and April admission for C. Diff 

and Shigella infection presents with vomiting and diarrhea. Patient states he 

has had nausea and vomiting for about 2 days. Then, today, he had about 10 

loose stools. At this point he came to the ED. He has epi-gastric abdominal 

pain as well but this seems mild to moderate. He was admitted in April and 

found to have C. Diff and Shigella infections. He reports doing well in the 

interim. In the ED his lactic acid was quite elevated presumably 2/2 

dehydration. 





History Information





- Allergies/Home Medication List


Allergies/Adverse Reactions: 








No Known Allergies Allergy (Verified 05/27/18 20:17)


 





Home Medications: 








oxyCODONE IR [Oxycodone Ir (*)] 5 mg PO Q6 PRN 04/01/18 [Last Taken Unknown]





I have personally reviewed and updated: family history, medical history





- Past Medical History


psychiatric history (PTSD, depression)





- Surgical History


Reports: spinal surgery





- Family History


Additional family history: Denies





- Social History


Smoking Status: Current every day smoker


Alcohol Use: Heavy (1 pt vodka daily)





Review of Systems


Review of Systems: 





ROS: 10pt was reviewed & negative except for what was stated in HPI & below





Physical Exam


Physical Exam: 

















Temp Pulse Resp BP Pulse Ox


 


 36.6 C   97   18   144/87 H  93 


 


 05/27/18 20:15  05/27/18 23:36  05/27/18 23:36  05/27/18 23:36  05/27/18 23:36











Constitutional: uncomfortable, unkempt


Eyes: PERRL, EOMI


Ears, Nose, Mouth, Throat: moist mucous membranes, no oral mucosal ulcers


Cardiovascular: no murmur, rub, or gallop, tachycardia


Respiratory: no respiratory distress, clear to auscultation


Gastrointestinal: normoactive bowel sounds, tenderness (Epi-gastric)


Skin: warm, normal color


Musculoskeletal: full muscle strength, no muscle tenderness


Neurologic: AAOx3, CN II-XII Intact, other (No tremor noted)


Psychiatric: interacting appropriately, not anxious





Lab Data & Imaging Review





 05/27/18 20:54





 05/27/18 20:54














WBC  4.97 10^3/uL (3.80-9.50)   05/27/18  20:54    


 


RBC  4.89 10^6/uL (4.40-6.38)   05/27/18  20:54    


 


Hgb  15.6 g/dL (13.7-17.5)   05/27/18  20:54    


 


Hct  46.2 % (40.0-51.0)   05/27/18  20:54    


 


MCV  94.5 fL (81.5-99.8)   05/27/18  20:54    


 


MCH  31.9 pg (27.9-34.1)   05/27/18  20:54    


 


MCHC  33.8 g/dL (32.4-36.7)   05/27/18  20:54    


 


RDW  14.5 % (11.5-15.2)   05/27/18  20:54    


 


Plt Count  145 10^3/uL (150-400)  L  05/27/18  20:54    


 


MPV  10.3 fL (8.7-11.7)   05/27/18  20:54    


 


Neut % (Auto)  70.9 % (39.3-74.2)   05/27/18  20:54    


 


Lymph % (Auto)  20.3 % (15.0-45.0)   05/27/18  20:54    


 


Mono % (Auto)  7.8 % (4.5-13.0)   05/27/18  20:54    


 


Eos % (Auto)  0.2 % (0.6-7.6)  L  05/27/18  20:54    


 


Baso % (Auto)  0.6 % (0.3-1.7)   05/27/18  20:54    


 


Nucleat RBC Rel Count  0.0 % (0.0-0.2)   05/27/18  20:54    


 


Absolute Neuts (auto)  3.52 10^3/uL (1.70-6.50)   05/27/18  20:54    


 


Absolute Lymphs (auto)  1.01 10^3/uL (1.00-3.00)   05/27/18  20:54    


 


Absolute Monos (auto)  0.39 10^3/uL (0.30-0.80)   05/27/18  20:54    


 


Absolute Eos (auto)  0.01 10^3/uL (0.03-0.40)  L  05/27/18  20:54    


 


Absolute Basos (auto)  0.03 10^3/uL (0.02-0.10)   05/27/18  20:54    


 


Absolute Nucleated RBC  0.00 10^3/uL (0-0.01)   05/27/18  20:54    


 


Immature Gran %  0.2 % (0.0-1.1)   05/27/18  20:54    


 


Immature Gran #  0.01 10^3/uL (0.00-0.10)   05/27/18  20:54    


 


VBG Lactic Acid  6.8 mmol/L (0.7-2.1)  H  05/27/18  21:54    


 


Sodium  143 mEq/L (135-145)   05/27/18  20:54    


 


Potassium  4.4 mEq/L (3.3-5.0)   05/27/18  20:54    


 


Chloride  104 mEq/L ()   05/27/18  20:54    


 


Carbon Dioxide  13 mEq/l (22-31)  L  05/27/18  20:54    


 


Anion Gap  26 mEq/L (8-16)  H  05/27/18  20:54    


 


BUN  12 mg/dL (7-23)   05/27/18  20:54    


 


Creatinine  0.6 mg/dL (0.7-1.3)  L  05/27/18  20:54    


 


Estimated GFR  > 60   05/27/18  20:54    


 


Glucose  65 mg/dL ()  L  05/27/18  20:54    


 


POC Glucose  71 mg/dL ()   05/27/18  22:38    


 


Calcium  9.3 mg/dL (8.5-10.4)   05/27/18  20:54    


 


Magnesium  1.6 mg/dL (1.6-2.3)   05/27/18  20:54    


 


Total Bilirubin  0.9 mg/dL (0.1-1.4)   05/27/18  20:54    


 


Conjugated Bilirubin  0.6 mg/dL (0.0-0.5)  H  05/27/18  20:54    


 


Unconjugated Bilirubin  0.3 mg/dL (0.0-1.1)   05/27/18  20:54    


 


AST  55 IU/L (17-59)   05/27/18  20:54    


 


ALT  32 IU/L (21-72)   05/27/18  20:54    


 


Alkaline Phosphatase  83 IU/L ()   05/27/18  20:54    


 


Total Protein  8.2 g/dL (6.3-8.2)   05/27/18  20:54    


 


Albumin  4.9 g/dL (3.5-5.0)   05/27/18  20:54    


 


Lipase  144 IU/L ()   05/27/18  20:54    


 


Urine Color  YELLOW   05/27/18  22:52    


 


Urine Appearance  CLEAR   05/27/18  22:52    


 


Urine pH  5.0  (5.0-7.5)   05/27/18  22:52    


 


Ur Specific Gravity  1.030  (1.002-1.030)   05/27/18  22:52    


 


Urine Protein  1+  (NEGATIVE)  H  05/27/18  22:52    


 


Urine Ketones  2+  (NEGATIVE)  H  05/27/18  22:52    


 


Urine Blood  NEGATIVE  (NEGATIVE)   05/27/18  22:52    


 


Urine Nitrate  NEGATIVE  (NEGATIVE)   05/27/18  22:52    


 


Urine Bilirubin  NEGATIVE  (NEGATIVE)   05/27/18  22:52    


 


Urine Urobilinogen  2.0 EU (0.2-1.0)  H  05/27/18  22:52    


 


Ur Leukocyte Esterase  NEGATIVE  (NEGATIVE)   05/27/18  22:52    


 


Urine RBC  1-3 /hpf (0-3)   05/27/18  22:52    


 


Urine WBC  1-3 /hpf (0-3)   05/27/18  22:52    


 


Ur Epithelial Cells  TRACE /lpf (NONE-1+)   05/27/18  22:52    


 


Urine Mucus  TRACE /lpf (NONE-1+)   05/27/18  22:52    


 


Urine Glucose  NEGATIVE  (NEGATIVE)   05/27/18  22:52    


 


Ethyl Alcohol  214 mg/dL (0-10)  H  05/27/18  20:54    








Imaging Review: 





 Imaging Impressions





Abdomen CT  05/27/18 20:53


Impression: 


1. No evidence of ischemic bowel or acute colitis.


2. Hepatic steatosis.


3. Normal caliber atherosclerotic aorta.


 


Findings discussed with Emergency Department physicianChristian  at  5/ 27/2018 22:45.


 


 








Chest X-Ray  05/27/18 22:17


Impression: Clear lungs. Negative portable chest











Visualized and Interpreted Chest x-ray results: Yes


Chest X-Ray results: no infiltrate


Visualized and Interpreted EKG results: Yes


EKG Interpretation: Positive for: other (Sinus tach)





Assessment & Plan


Assessment: 








69 yo M w/ hx of ETOH abuse, chronic back pain, and April admission for C. Diff 

and Shigella infection presents with vomiting and diarrhea.








Plan: 


1. Vomiting, diarrhea - Suspicious for recurrent GI infection; he was admitted 

last month with C. Diff and Shigella infections. 


- GI PCR


- mIVF, clear liquid, ADAT


- Anti-emetics, pain control


2. Lactic acidosis - 2/2 above; 8.6->6.8 s/p 2 L IVF.


- Continue IVF resuscitation


- Recheck lactic acid w/ morning labs


3. ETOH abuse - Drinks 1 pt vodka daily w/ significant w/d symptoms. Last drink 

at 11 AM on 5/27. No s/s of w/d currently.


- CIWA protocol ordered


- Daily MVI, folate, thiamine


- CM consult placed


4. Chronic pain - 2/2 back surgery.





Diet - Clears, ADAT


Code - Full


Ppx - SCDs


Dispo - Admit under inpatient status, likely >2 MN to stabilize noting severity 

of symptoms and likely impending ETOH withdrawal.

## 2018-05-27 NOTE — EDPHY
General


Time Seen by Provider: 05/27/18 21:07


Narrative: 





CHIEF COMPLAINT: 


Nausea vomiting diarrhea





HISTORY OF PRESENT ILLNESS: 


Patient presents with complaints of nausea, vomiting diarrhea.  He says "I have 

been sick real sick." He complains of nearly 4 weeks of symptoms.  He was seen 

here for the same complaints and treated with ciprofloxacin and vancomycin by 

mouth for C difficile colitis.  He has ongoing pain, fever, nausea, vomiting 

diarrhea.  Nonbloody stool or emesis.  No trauma or injury.  He continues to 

drink alcohol and smokes cigarettes.  No modifying factors or alleviating 

factors.  No other associated complaints or modifying factors.





REVIEW OF SYSTEMS:


Ten systems reviewed and are negative unless otherwise noted in the HPI





PCP:


"I don't go to one"





SPECIALISTS:


None





PAST MEDICAL HISTORY: 


C difficile colitis, hypertension, alcohol abuse





PAST SURGICAL HISTORY:


Denies any recent surgery





SOCIAL HISTORY:


Daily smoker and heavy alcohol ingestion





FAMILY HISTORY:


Noncontributory





EXAMINATION


General Appearance:  Alert, no distress.  Unkempt.  Well-developed and well-

nourished.  Strong odor of cigarette smoke


Head: normocephalic, atraumatic


Eyes:  Pupils equal and round, no conjunctival pallor or injection


ENT, Mouth:  Mucous membranes moist.  Airway patent.  Poor dentition.


Neck:  Normal inspection, supple, non-tender


Respiratory:  Lungs are clear to auscultation


Cardiovascular:  Tachycardic rate and regular rhythm.  No murmur


Gastrointestinal:  Abdomen is soft and nondistended.  There are bowel sounds in 

all 4 quadrants.  There is tenderness in all 4 quadrants out of proportion to 

exam.


Back: non-tender, no bony abnormalities


Neurological:  GCS 15. A&O, nonfocal, normal gait.  No tremor.  Strength is 

symmetric in all 4 limbs.  No pronator drift.


Skin:  Warm and dry, no rash no petechiae or purpura


Extremities:  Nontender, no pedal edema


Psychiatric:  Mood and affect normal





DIFFERENTIAL DIAGNOSES:


Including but not limited to sepsis, alcohol abuse, dehydration, C difficile 

colitis, colitis, diverticulitis, gastritis, alcoholic gastritis, alcoholic 

ketoacidosis








MDM:


8:30 p.m.


Nausea, vomiting, abdominal pain and diarrhea in a patient with recent C 

difficile colitis.  He is tachycardic and aggravated.  He is known to this 

emergency department as a frequent alcohol user.  Concern for his high heart 

rate and recent infection.  Thus we will obtain blood cultures, lactic acid and 

a full sepsis workup.  He is awake and alert, fully ambulatory in no acute 

distress.  He does not exhibit any signs of withdrawal or delirium tremens.





9:05 p.m.


No leukocytosis but the patient's initial lactic acid is 8.6.  Dr. McCollester 

has been notified.  Commence aggressive IV fluid resuscitation with close 

monitoring.  Remainder of workup is pending.  Blood glucose was marginal at 67 

but he is drinking juice, remains awake alert no acute distress.  I ordered 5 

min folic acid.





10:10 p.m.


Patient is currently going to CT scanner.  We have made multiple attempts to 

expedite his CT scan but he has now been taken over there.  We are also 

rechecking his lactic at blood glucose as it was markedly low earlier.





10:40 p.m.


Notified by radiologist Dr. Costa.  CT scan reveals no evidence of colitis or 

ischemic bowel.  There are chronic changes as noted.  The patient's repeat 

lactic acid is 6.8 after 2 L IV fluid.  I have reviewed this with Dr. Khan.  

I do not feel that this is related to sepsis, nor does Dr. Khan.  This is 

likely due to alcoholic ketoacidosis with chronic liver abnormalities and 

dehydration.  His chest x-ray is clear.  We will obtain a urine but there is no 

obvious signs of pneumonia as he has no fever and has a normal white count.  We 

will commence a 3rd L of IV normal saline, and in addition 500 mL of D5 half-

normal saline.  Addition will treat him with magnesium sulfate IV and admit to 

the hospital.





11:15 p.m.


Case discussed with hospitalist Senthil Crockett.  He will admit the patient to his 

service.  The patient is admitted in stable condition to a medical-surgical 

bed.  He has yet to provide a urine or stool sample.








SUPERVISION:


Patient was evaluated and examined in conjunction with my secondary supervising 

physician as documented. We have both examined the patient.











- Diagnostics


Imaging Results: 


 Imaging Impressions





Abdomen CT  05/27/18 20:53


Impression: 


1. No evidence of ischemic bowel or acute colitis.


2. Hepatic steatosis.


3. Normal caliber atherosclerotic aorta.


 


Findings discussed with Emergency Department physician, Christian Lacy  at  5/ 27/2018 22:45.


 


 








Chest X-Ray  05/27/18 22:17


Impression: Clear lungs. Negative portable chest














- History


Smoking Status: Current every day smoker





- Objective


Vital Signs: 


 Initial Vital Signs











Temperature (C)  97.9 F   05/27/18 20:15


 


Heart Rate  142 H  05/27/18 20:15


 


Respiratory Rate  20   05/27/18 20:15


 


Blood Pressure  143/103 H  05/27/18 20:15


 


O2 Sat (%)  93   05/27/18 20:15








 











O2 Delivery Mode               Nasal Cannula


 


O2 (L/minute)                  2














Allergies/Adverse Reactions: 


 





No Known Allergies Allergy (Verified 05/27/18 20:17)


 








Home Medications: 














 Medication  Instructions  Recorded


 


oxyCODONE IR [Oxycodone Ir (*)] 5 mg PO Q6 PRN 04/01/18


 


Ciprofloxacin [Cipro] 500 mg PO BID@1000,2000 #20 tab 04/05/18


 


LORazepam [Ativan (*)] 1 mg PO TID PRN #20 tab 04/05/18


 


Melatonin [Melatonin 3 MG (*)] 3 mg PO HS #30 tab 04/05/18


 


Nicotine [Nicotine Patch] 21 mg TD DAILY #30 patch.td24 04/05/18


 


Vancomycin [Vancocin Oral Liquid] 125 mg PO QID #40 udl 04/05/18


 


Ciprofloxacin HCl [Ciprofloxacin] 500 mg PO BID #12 tab 04/06/18


 


Vancomycin [Vancomycin (*)] 125 mg PO Q6 #52 cap 04/06/18











Laboratory Results: 


 Laboratory Results





 05/27/18 20:54 





 05/27/18 20:54 





 











  05/27/18 05/27/18 05/27/18





  22:52 22:38 21:54


 


WBC      





    


 


RBC      





    


 


Hgb      





    


 


Hct      





    


 


MCV      





    


 


MCH      





    


 


MCHC      





    


 


RDW      





    


 


Plt Count      





    


 


MPV      





    


 


Neut % (Auto)      





    


 


Lymph % (Auto)      





    


 


Mono % (Auto)      





    


 


Eos % (Auto)      





    


 


Baso % (Auto)      





    


 


Nucleat RBC Rel Count      





    


 


Absolute Neuts (auto)      





    


 


Absolute Lymphs (auto)      





    


 


Absolute Monos (auto)      





    


 


Absolute Eos (auto)      





    


 


Absolute Basos (auto)      





    


 


Absolute Nucleated RBC      





    


 


Immature Gran %      





    


 


Immature Gran #      





    


 


VBG Lactic Acid      6.8 mmol/L H mmol/L





     (0.7-2.1) 


 


Sodium      





    


 


Potassium      





    


 


Chloride      





    


 


Carbon Dioxide      





    


 


Anion Gap      





    


 


BUN      





    


 


Creatinine      





    


 


Estimated GFR      





    


 


Glucose      





    


 


POC Glucose    71 mg/dL mg/dL  





    ()  


 


Calcium      





    


 


Magnesium      





    


 


Total Bilirubin      





    


 


Conjugated Bilirubin      





    


 


Unconjugated Bilirubin      





    


 


AST      





    


 


ALT      





    


 


Alkaline Phosphatase      





    


 


Total Protein      





    


 


Albumin      





    


 


Lipase      





    


 


Urine Color  Pending     





    


 


Urine Appearance  Pending     





    


 


Urine pH  Pending     





    


 


Ur Specific Gravity  Pending     





    


 


Urine Protein  Pending     





    


 


Urine Ketones  Pending     





    


 


Urine Blood  Pending     





    


 


Urine Nitrate  Pending     





    


 


Urine Bilirubin  Pending     





    


 


Urine Urobilinogen  Pending     





    


 


Ur Leukocyte Esterase  Pending     





    


 


Urine RBC  Pending     





    


 


Urine WBC  Pending     





    


 


Ur Epithelial Cells  Pending     





    


 


Urine Glucose  Pending     





    


 


Ethyl Alcohol      





    














  05/27/18 05/27/18 05/27/18





  20:54 20:54 20:54


 


WBC      





    


 


RBC      





    


 


Hgb      





    


 


Hct      





    


 


MCV      





    


 


MCH      





    


 


MCHC      





    


 


RDW      





    


 


Plt Count      





    


 


MPV      





    


 


Neut % (Auto)      





    


 


Lymph % (Auto)      





    


 


Mono % (Auto)      





    


 


Eos % (Auto)      





    


 


Baso % (Auto)      





    


 


Nucleat RBC Rel Count      





    


 


Absolute Neuts (auto)      





    


 


Absolute Lymphs (auto)      





    


 


Absolute Monos (auto)      





    


 


Absolute Eos (auto)      





    


 


Absolute Basos (auto)      





    


 


Absolute Nucleated RBC      





    


 


Immature Gran %      





    


 


Immature Gran #      





    


 


VBG Lactic Acid      8.6 mmol/L H mmol/L





     (0.7-2.1) 


 


Sodium      





    


 


Potassium      





    


 


Chloride      





    


 


Carbon Dioxide      





    


 


Anion Gap      





    


 


BUN      





    


 


Creatinine      





    


 


Estimated GFR      





    


 


Glucose      





    


 


POC Glucose      





    


 


Calcium      





    


 


Magnesium  1.6 mg/dL mg/dL    





   (1.6-2.3)   


 


Total Bilirubin    0.9 mg/dL mg/dL  





    (0.1-1.4)  


 


Conjugated Bilirubin      





    


 


Unconjugated Bilirubin      





    


 


AST      





    


 


ALT      





    


 


Alkaline Phosphatase      





    


 


Total Protein      





    


 


Albumin      





    


 


Lipase      





    


 


Urine Color      





    


 


Urine Appearance      





    


 


Urine pH      





    


 


Ur Specific Gravity      





    


 


Urine Protein      





    


 


Urine Ketones      





    


 


Urine Blood      





    


 


Urine Nitrate      





    


 


Urine Bilirubin      





    


 


Urine Urobilinogen      





    


 


Ur Leukocyte Esterase      





    


 


Urine RBC      





    


 


Urine WBC      





    


 


Ur Epithelial Cells      





    


 


Urine Glucose      





    


 


Ethyl Alcohol    214 mg/dL H mg/dL  





    (0-10)  














  05/27/18 05/27/18





  20:54 20:54


 


WBC    4.97 10^3/uL 10^3/uL





    (3.80-9.50) 


 


RBC    4.89 10^6/uL 10^6/uL





    (4.40-6.38) 


 


Hgb    15.6 g/dL g/dL





    (13.7-17.5) 


 


Hct    46.2 % %





    (40.0-51.0) 


 


MCV    94.5 fL fL





    (81.5-99.8) 


 


MCH    31.9 pg pg





    (27.9-34.1) 


 


MCHC    33.8 g/dL g/dL





    (32.4-36.7) 


 


RDW    14.5 % %





    (11.5-15.2) 


 


Plt Count    145 10^3/uL L 10^3/uL





    (150-400) 


 


MPV    10.3 fL fL





    (8.7-11.7) 


 


Neut % (Auto)    70.9 % %





    (39.3-74.2) 


 


Lymph % (Auto)    20.3 % %





    (15.0-45.0) 


 


Mono % (Auto)    7.8 % %





    (4.5-13.0) 


 


Eos % (Auto)    0.2 % L %





    (0.6-7.6) 


 


Baso % (Auto)    0.6 % %





    (0.3-1.7) 


 


Nucleat RBC Rel Count    0.0 % %





    (0.0-0.2) 


 


Absolute Neuts (auto)    3.52 10^3/uL 10^3/uL





    (1.70-6.50) 


 


Absolute Lymphs (auto)    1.01 10^3/uL 10^3/uL





    (1.00-3.00) 


 


Absolute Monos (auto)    0.39 10^3/uL 10^3/uL





    (0.30-0.80) 


 


Absolute Eos (auto)    0.01 10^3/uL L 10^3/uL





    (0.03-0.40) 


 


Absolute Basos (auto)    0.03 10^3/uL 10^3/uL





    (0.02-0.10) 


 


Absolute Nucleated RBC    0.00 10^3/uL 10^3/uL





    (0-0.01) 


 


Immature Gran %    0.2 % %





    (0.0-1.1) 


 


Immature Gran #    0.01 10^3/uL 10^3/uL





    (0.00-0.10) 


 


VBG Lactic Acid    





   


 


Sodium  143 mEq/L mEq/L  





   (135-145)  


 


Potassium  4.4 mEq/L mEq/L  





   (3.3-5.0)  


 


Chloride  104 mEq/L mEq/L  





   ()  


 


Carbon Dioxide  13 mEq/l L mEq/l  





   (22-31)  


 


Anion Gap  26 mEq/L H mEq/L  





   (8-16)  


 


BUN  12 mg/dL mg/dL  





   (7-23)  


 


Creatinine  0.6 mg/dL L mg/dL  





   (0.7-1.3)  


 


Estimated GFR  > 60   





   


 


Glucose  65 mg/dL L mg/dL  





   ()  


 


POC Glucose    





   


 


Calcium  9.3 mg/dL mg/dL  





   (8.5-10.4)  


 


Magnesium    





   


 


Total Bilirubin  0.9 mg/dL mg/dL  





   (0.1-1.4)  


 


Conjugated Bilirubin  0.6 mg/dL H mg/dL  





   (0.0-0.5)  


 


Unconjugated Bilirubin  0.3 mg/dL mg/dL  





   (0.0-1.1)  


 


AST  55 IU/L IU/L  





   (17-59)  


 


ALT  32 IU/L IU/L  





   (21-72)  


 


Alkaline Phosphatase  83 IU/L IU/L  





   ()  


 


Total Protein  8.2 g/dL g/dL  





   (6.3-8.2)  


 


Albumin  4.9 g/dL g/dL  





   (3.5-5.0)  


 


Lipase  144 IU/L IU/L  





   ()  


 


Urine Color    





   


 


Urine Appearance    





   


 


Urine pH    





   


 


Ur Specific Gravity    





   


 


Urine Protein    





   


 


Urine Ketones    





   


 


Urine Blood    





   


 


Urine Nitrate    





   


 


Urine Bilirubin    





   


 


Urine Urobilinogen    





   


 


Ur Leukocyte Esterase    





   


 


Urine RBC    





   


 


Urine WBC    





   


 


Ur Epithelial Cells    





   


 


Urine Glucose    





   


 


Ethyl Alcohol    





   











Medications Given: 


 





Magnesium Sulfate (Magnesium Sulf 2 Gm (Premix))  50 mls @ 50 mls/hr IV EDNOW 

ONE


   Stop: 05/27/18 23:48


   Last Admin: 05/27/18 22:55 Dose:  50 mls





Discontinued Medications





Folic Acid (Folic Acid)  1 mg PO EDNOW ONE


   Stop: 05/27/18 21:34


   Last Admin: 05/27/18 21:55 Dose:  1 mg


Sodium Chloride (Ns)  1,000 mls @ 0 mls/hr IV ONCE ONE; Wide Open


   PRN Reason: Protocol


   Stop: 05/27/18 20:40


   Last Admin: 05/27/18 20:52 Dose:  1,000 mls


Sodium Chloride (Ns)  1,000 mls @ 0 mls/hr IV EDNOW ONE; Wide Open


   PRN Reason: Protocol


   Stop: 05/27/18 21:08


   Last Admin: 05/27/18 21:10 Dose:  1,000 mls


Sodium Chloride (Ns)  1,000 mls @ 0 mls/hr IV EDNOW ONE; Wide Open


   PRN Reason: Protocol


   Stop: 05/27/18 22:48


   Last Admin: 05/27/18 22:56 Dose:  1,000 mls


Sodium Chloride (Ns)  500 mls @ 0 mls/hr IV EDNOW ONE; Wide Open


   PRN Reason: Protocol


   Stop: 05/27/18 22:48


   Last Admin: 05/27/18 22:57 Dose:  500 mls


Lorazepam (Ativan Injection)  2 mg IVP EDNOW ONE


   Stop: 05/27/18 21:08


   Last Admin: 05/27/18 21:22 Dose:  2 mg


Ondansetron HCl (Zofran)  4 mg IVP EDNOW ONE


   Stop: 05/27/18 20:53


   Last Admin: 05/27/18 21:08 Dose:  4 mg


Thiamine HCl (Vitamin B-1)  100 mg PO EDNOW ONE


   Stop: 05/27/18 21:34


   Last Admin: 05/27/18 21:55 Dose:  100 mg





Point of Care Test Results: 


 











  05/27/18





  22:38


 


POC Glucose  71














Departure





- Departure


Disposition: [facility] Inpatient Acute


Clinical Impression: 


 Alcoholic ketoacidosis, Fluid volume depletion





Diarrhea


Qualifiers:


 Diarrhea type: unspecified type Qualified Code(s): R19.7 - Diarrhea, 

unspecified





Vomiting


Qualifiers:


 Vomiting type: unspecified Vomiting Intractability: non-intractable Nausea 

presence: with nausea Qualified Code(s): R11.2 - Nausea with vomiting, 

unspecified





Condition: Good

## 2018-05-27 NOTE — CPEKG
Heart Rate: 106

RR Interval: 566

P-R Interval: 160

QRSD Interval: 82

QT Interval: 344

QTC Interval: 457

P Axis: 74

QRS Axis: 66

T Wave Axis: 72

EKG Severity - OTHERWISE NORMAL ECG -

EKG Impression: SINUS TACHYCARDIA

Electronically Signed By: Colton Santo 30-May-2018 11:59:53

## 2018-05-28 LAB — PLATELET # BLD: 97 10^3/UL (ref 150–400)

## 2018-05-28 RX ADMIN — Medication SCH MG: at 08:37

## 2018-05-28 RX ADMIN — DEXTROSE MONOHYDRATE AND SODIUM CHLORIDE SCH MLS: 5; .45 INJECTION, SOLUTION INTRAVENOUS at 00:40

## 2018-05-28 RX ADMIN — Medication SCH MG: at 00:39

## 2018-05-28 RX ADMIN — VANCOMYCIN HYDROCHLORIDE SCH MG: KIT at 20:32

## 2018-05-28 RX ADMIN — THERA TABS SCH EACH: TAB at 08:37

## 2018-05-28 RX ADMIN — DEXTROSE MONOHYDRATE AND SODIUM CHLORIDE SCH MLS: 5; .45 INJECTION, SOLUTION INTRAVENOUS at 10:01

## 2018-05-28 RX ADMIN — FOLIC ACID SCH MG: 1 TABLET ORAL at 08:37

## 2018-05-28 RX ADMIN — VANCOMYCIN HYDROCHLORIDE SCH MG: KIT at 15:48

## 2018-05-28 RX ADMIN — VANCOMYCIN HYDROCHLORIDE SCH MG: KIT at 09:59

## 2018-05-28 RX ADMIN — VANCOMYCIN HYDROCHLORIDE SCH MG: KIT at 11:49

## 2018-05-28 NOTE — HOSPPROG
Hospitalist Progress Note


Assessment/Plan: 





#Recurrent C diff: recent C diff and E coli-Shiga toxin infection in April. GI 

panel positive for both here. Shiga-toxin likely false-positive and would not 

want to treat , b/c can trigger HUS.


-will repeat C diff treatment with increased diarrhea. Spoke with Dr. Figueroa and 

he agreed with plan





#Hypophosphatemia: replete





#Etoh abuse: requiring Ativan. CIWA, MV/T/F





#Metabolic anion gap acidosis: due to starvation and alcoholic ketosis





#Normocytic anemia/thrombocytopenia: due to marrow suppression with Etoh





#Hypoglycemia: due to decreased PO intake. D5 IV





#Chronic pain: stable





#Diet: regular





#Disp: requires inpatient admission for IVFs, CIWA


Subjective: 10 loose stools daily, emesis. No melena or hematemesis


Objective: 


 Vital Signs











Temp Pulse Resp BP Pulse Ox


 


 37.1 C   82   24 H  143/89 H  92 


 


 05/28/18 07:11  05/28/18 07:11  05/28/18 07:11  05/28/18 07:11  05/28/18 07:11








 Laboratory Results





 05/28/18 05:25 





 05/28/18 05:25 





 











 05/27/18 05/28/18 05/29/18





 05:59 05:59 05:59


 


Intake Total  4550 


 


Output Total   20


 


Balance  4550 -20














- Time Spent With Patient


Time Spent with Patient: greater than 35 minutes


Time Spent with Patient: Greater than 35 minutes spent on this patients care, 

greater than 50% of time spent counseling, educating, and coordinating care 

regarding the above mentioned plan.





ICD10 Worksheet


Patient Problems: 


 Problems











Problem Status Onset


 


Alcoholic ketoacidosis Acute  


 


Diarrhea Acute  


 


Fluid volume depletion Acute  


 


Vomiting Acute  


 


Acute alcohol intoxication Acute  


 


Alcohol withdrawal Acute  


 


Alcoholic hepatitis Acute  


 


Alcoholic intoxication Acute  


 


Alcoholism Acute  


 


Pancreatitis Acute

## 2018-05-28 NOTE — ASMTCASEMG
Living Arrangements

 

What is your living           Answers:  Alone                                 

arrangement? Who do you                                                       

live with?                                                                    

Type Of Residence

 

What kind of residence do     Answers:  Apartment                             

you live in?                                                                  

Discharge Plan Comments

 

Coordination Status           

Comments                      

Notes:

Patient is a 67yo  male with a hx of ETOH abuse and chronic back pain who was admitted for 

vomiting and diarrhea. Patient had an April admission for C-Diff and Shigella infection. Patient 

drinks 1 pint of vodka daily with significant withdrawal symptoms. CIWA protocol has been 

initiated. CAGE to be completed when patient is feeling better. Cardiac rehab consult ordered. D/C 

plan TBD. CM will follow.

 

Date Signed:  05/28/2018 04:03 PM

Electronically Signed By:Agatha Nicole LCSW

## 2018-05-29 VITALS — DIASTOLIC BLOOD PRESSURE: 95 MMHG | SYSTOLIC BLOOD PRESSURE: 131 MMHG

## 2018-05-29 RX ADMIN — FOLIC ACID SCH MG: 1 TABLET ORAL at 08:47

## 2018-05-29 RX ADMIN — VANCOMYCIN HYDROCHLORIDE SCH MG: KIT at 05:11

## 2018-05-29 RX ADMIN — Medication SCH MG: at 08:47

## 2018-05-29 RX ADMIN — THERA TABS SCH EACH: TAB at 08:47

## 2018-05-29 NOTE — HOSPPROG
Hospitalist Progress Note


Assessment/Plan: 





#Recurrent C diff: recent C diff and E coli-Shiga toxin infection in April. GI 

panel positive for both here. 


   Shiga-toxin likely false-positive and would not want to treat , b/c can 

trigger HUS.


   -will repeat C diff treatment with PO vancomycin QID


   - will need to stabilize prior to dc





#Hypophosphatemia: replete





#Etoh abuse: requiring Ativan. CIWA, MV/T/F





#Metabolic anion gap acidosis: due to starvation and alcoholic ketosis





#Normocytic anemia/thrombocytopenia: due to marrow suppression with Etoh





#Hypoglycemia: due to decreased PO intake. D5 IV





#Chronic pain: stable





#Diet: regular





#Disp: requires inpatient admission for IVFs, CIWA





I have discussed the case with RN -pt pancho to leave AMA to go drink alcohol


Subjective: anxious


Objective: 


 Vital Signs











Temp Pulse Resp BP Pulse Ox


 


 36.7 C   92   16   131/95 H  90 L


 


 05/29/18 08:00  05/29/18 08:00  05/29/18 08:00  05/29/18 08:00  05/29/18 08:00








 Microbiology











 05/28/18 03:30 Gastrointestinal Tract Panel (PCR) - Final





 Stool    Clostridium Difficile Detected





    E.coli Shiga-Like Toxin Pos








 Laboratory Results





 05/28/18 05:25 





 05/29/18 04:35 





 











 05/28/18 05/29/18 05/30/18





 05:59 05:59 05:59


 


Intake Total 4550 1853 


 


Output Total  1795 


 


Balance 4550 58 














- Physical Exam


Constitutional: chronically ill appearing


Eyes: anicteric sclera


Ears, Nose, Mouth, Throat: dry mucous membranes


Cardiovascular: regular rate and rhythym


Respiratory: no respiratory distress


Gastrointestinal: normoactive bowel sounds


Genitourinary: no bladder fullness


Skin: warm


Musculoskeletal: No asymmetric calves


Neurologic: AAOx3


Psychiatric: agitated


Lymph, Heme, Immunologic: no cervical LAD





ICD10 Worksheet


Patient Problems: 


 Problems











Problem Status Onset


 


Acute alcohol intoxication Acute  


 


Alcohol withdrawal Acute  


 


Alcoholic hepatitis Acute  


 


Alcoholic intoxication Acute  


 


Alcoholic ketoacidosis Acute  


 


Alcoholism Acute  


 


Diarrhea Acute  


 


Fluid volume depletion Acute  


 


Pancreatitis Acute  


 


Vomiting Acute

## 2018-05-30 ENCOUNTER — HOSPITAL ENCOUNTER (EMERGENCY)
Dept: HOSPITAL 80 - FED | Age: 68
Discharge: LEFT BEFORE DISCHARGE | End: 2018-05-30
Payer: COMMERCIAL

## 2018-05-30 VITALS — DIASTOLIC BLOOD PRESSURE: 79 MMHG | SYSTOLIC BLOOD PRESSURE: 124 MMHG

## 2018-05-30 DIAGNOSIS — Z53.21: Primary | ICD-10-CM

## 2018-05-30 NOTE — ASMTCMCOM
CM Note

 

CM Note                       

Notes:

Patient presents to the ER via EMS with c/o N/V/D.  Patient admits to being intoxicated as 

well.  Patient was IP on 3E yesterday and left (code wanderer) the hospital.  See chart for 

details.  Today patient leaves the ER prior to being seen by a physician (within 30 min of 

arrival).

 

Date Signed:  05/30/2018 01:05 PM

Electronically Signed By:Mili Reaves RN

## 2018-05-30 NOTE — PDMN
Medical Necessity


Medical necessity: Pt meets IP criteria per MD; est los >2 mn for eval/tx of 

vomiting, diarrhea, abdominal pain, lactic acidosis & impending ETOH withdrawal

; admit for further workup/monitoring, IVFs, pain management, CIWA protocol & 

CM consult; hx recent hospitalization for C. Diff & Shigella infections, 

alcoholism; per H&P & order 5/27/18

## 2018-06-07 ENCOUNTER — HOSPITAL ENCOUNTER (EMERGENCY)
Dept: HOSPITAL 80 - FED | Age: 68
Discharge: HOME | End: 2018-06-07
Payer: COMMERCIAL

## 2018-06-07 VITALS — DIASTOLIC BLOOD PRESSURE: 74 MMHG | SYSTOLIC BLOOD PRESSURE: 122 MMHG

## 2018-06-07 DIAGNOSIS — F10.20: ICD-10-CM

## 2018-06-07 DIAGNOSIS — R07.9: Primary | ICD-10-CM

## 2018-06-07 DIAGNOSIS — Z86.73: ICD-10-CM

## 2018-06-07 DIAGNOSIS — F17.200: ICD-10-CM

## 2018-06-07 DIAGNOSIS — E87.2: ICD-10-CM

## 2018-06-07 DIAGNOSIS — E86.9: ICD-10-CM

## 2018-06-07 LAB — PLATELET # BLD: 157 10^3/UL (ref 150–400)

## 2018-06-07 PROCEDURE — G0480 DRUG TEST DEF 1-7 CLASSES: HCPCS

## 2018-06-07 PROCEDURE — 99285 EMERGENCY DEPT VISIT HI MDM: CPT

## 2018-06-07 PROCEDURE — 74177 CT ABD & PELVIS W/CONTRAST: CPT

## 2018-06-07 PROCEDURE — 93005 ELECTROCARDIOGRAM TRACING: CPT

## 2018-06-07 PROCEDURE — 71045 X-RAY EXAM CHEST 1 VIEW: CPT

## 2018-06-07 PROCEDURE — 96361 HYDRATE IV INFUSION ADD-ON: CPT

## 2018-06-07 PROCEDURE — 96375 TX/PRO/DX INJ NEW DRUG ADDON: CPT

## 2018-06-07 PROCEDURE — 96374 THER/PROPH/DIAG INJ IV PUSH: CPT

## 2018-06-07 NOTE — EDPHY
H & P


Stated Complaint: cp, dizzy


Time Seen by Provider: 06/07/18 01:36


HPI/ROS: 





HPI


The patient presents brought in by ambulance from his home for chest pain.  The 

patient says that he has had several hours of chest pain which began after he 

stood to walk to his living room after lying down for several hours.  The pain 

started suddenly, is throughout his chest, is dull in nature, is associated 

with nausea, vomiting, dizziness, abdominal pains.  He has never had this pain 

before.  He has a longstanding history of alcohol abuse and drink 1 pt of hard 

alcohol earlier in the day.  He says he does not feel as if he is in alcohol 

withdrawal.





According to the paramedics, on arrival his heart rate was in the 140s and he 

was combative, unwilling to cooperate and answer their questions..





REVIEW OF SYSTEMS


Constitutional:  No fever, no chills.


Eyes:  No discharge.


ENT:  No sore throat.


Cardiovascular:  Positive for chest pain, no palpitations.


Respiratory:  No cough, no shortness of breath.


Gastrointestinal:  No abdominal pain, positive for vomiting.


Genitourinary:  No hematuria.


Musculoskeletal:  No back pain.


Skin:  No rashes.


Neurological:  No headache.





PMHx:  History of colitis requiring admission in March of 2018, history of 

alcoholic pancreatitis, history of alcoholic hepatitis, history of alcohol 

withdrawal





Soc Hx:  Lives at home, longstanding history of alcohol abuse








PHYSICAL


General Appearance: Alert, uncomfortable appearing


Eyes: Pupils equal and round no pallor or injection


ENT, Mouth: Mucous membranes dry


Respiratory: There are no retractions, lungs are clear to auscultation


Cardiovascular:  Tachycardic rate and regular rhythm


Gastrointestinal:  Abdomen is soft and non-tender, no masses, bowel sounds 

normal 


Neurological:  A&O, moves all extremities, hand tremor is present


Skin:  Warm and dry, no rashes


Musculoskeletal: Neck is supple non tender 


Extremities:  symmetrical, full range of motion 


Psychiatric:  Patient is oriented X 3, there is no agitation 





Source: Patient, EMS, Old records


Exam Limitations: Intoxication





- Personal History


Current Tetanus Diphtheria and Acellular Pertussis (TDAP): Yes





- Medical/Surgical History


Hx Asthma: No


Hx Chronic Respiratory Disease: Yes


Hx Diabetes: No


Hx Cardiac Disease: No


Hx Renal Disease: No


Hx Cirrhosis: No


Hx Alcoholism: Yes


Hx HIV/AIDS: No


Hx Splenectomy or Spleen Trauma: No


Other PMH: ETOH and opiate abuse, chronic back and shoulder pain, R rotator 

cuff surgery x 2 PTSD, CVA, emphysema. PNA





- Social History


Smoking Status: Current every day smoker


Constitutional: 


 Initial Vital Signs











Temperature (C)  36.7 C   06/07/18 01:37


 


Heart Rate  132 H  06/07/18 01:37


 


Respiratory Rate  18   06/07/18 01:37


 


Blood Pressure  151/109 H  06/07/18 01:37


 


O2 Sat (%)  96   06/07/18 01:37








 











O2 Delivery Mode               Room Air


 


O2 (L/minute)                  2














Allergies/Adverse Reactions: 


 





No Known Allergies Allergy (Verified 05/27/18 20:17)


 











Medical Decision Making





- Diagnostics


EKG Interpretation: 





EKG:  Complete interpretation has been separately recorded in the [facility] 

archive.  Summary impression:  Sinus tachycardia





Imaging Results: 


Chest x-ray single view shows no infiltrate, no effusion, interpreted by me, 

radiology interpretation is pending.





CT abdomen pelvis with IV contrast shows no acute abnormalities, discussed with 

Dr. Oppenheimer of Radiology.


Differential Diagnosis: 





This is a 68-year-old male with longstanding history of alcohol abuse, history 

of alcoholic pancreatitis, hepatitis, alcohol withdrawals, recent admission for 

colitis who presents from home brought in by ambulance for chest pain which is 

associated with abdominal pain, nausea, vomiting, dizziness, diaphoresis.  On 

arrival here, he was somewhat agitated and tachycardic.  He does appear to be 

in mild alcohol withdrawal.





I met the paramedics at the bedside to obtain their report.





EKG was obtained and did reveal a sinus tachycardia.  The patient was started 

on a L of fluid and Ativan.  He was exhibiting signs of mild alcohol withdrawal 

with a hand tremor.  Basic labs were checked and did reveal a lactic acidosis.  

This could be related to underlying infection verses type B lactic acidosis 

from his alcohol use coupled with alcoholic ketoacidosis.  Blood cultures were 

sent.  Patient's heart rate improved significantly after receiving fluids and 

medication and was in the low 100s.





He continued to come to complain of abdominal pain.  Thus, CT scan of his 

abdomen was obtained which showed no acute abnormalities.





He was given 2 L of normal saline and then was able to tolerate p.o..  He was 

given juice and crackers which he was able to take without vomiting.





I repeated his labs and his lactate went from 7.5-3.7.  He never exhibited any 

signs of sepsis.  He did not have a leukocytosis, fever, sign of infection.  

His anion gap decreased from 26-18.  I feel he is likely recovering from 

alcoholic ketoacidosis and has a type B lactic acidosis from his hepatitis from 

alcohol use.  I do not think he needs to be admitted to the hospital for this.  

His vital signs are normal.  He feels well.  He is asking to go home.





He will be discharged.  I have instructed him that he needs to stop drinking so 

that he does not get ill like this again.





- Data Points


Laboratory Results: 


 Laboratory Results





 06/07/18 02:00 





 06/07/18 06:00 





 











  06/07/18 06/07/18 06/07/18





  06:00 05:28 05:22


 


WBC      





    


 


RBC      





    


 


Hgb      





    


 


POC Hgb    13.9 gm/dL gm/dL  





    (13.7-17.5)  


 


Hct      





    


 


POC Hct    41 % %  





    (40-51)  


 


MCV      





    


 


MCH      





    


 


MCHC      





    


 


RDW      





    


 


Plt Count      





    


 


MPV      





    


 


Neut % (Auto)      





    


 


Lymph % (Auto)      





    


 


Mono % (Auto)      





    


 


Eos % (Auto)      





    


 


Baso % (Auto)      





    


 


Nucleat RBC Rel Count      





    


 


Absolute Neuts (auto)      





    


 


Absolute Lymphs (auto)      





    


 


Absolute Monos (auto)      





    


 


Absolute Eos (auto)      





    


 


Absolute Basos (auto)      





    


 


Absolute Nucleated RBC      





    


 


Immature Gran %      





    


 


Immature Gran #      





    


 


POC Blood Source      





    


 


Patient Temperature      





    


 


POC VBG pH      





    


 


POC VBG pCO2      





    


 


POC VBG pO2      





    


 


POC VBG HCO3      





    


 


POC VBG Total CO2      





    


 


POC VBG Base Excess      





    


 


VBG Lactic Acid      





    


 


POC Mix VBG O2 Sat      





    


 


POC Sodium    140 mEq/L mEq/L  





    (135-145)  


 


Sodium  142 mEq/L mEq/L    





   (135-145)   


 


POC Potassium    4.1 mEq/L mEq/L  





    (3.3-5.0)  


 


Potassium  4.5 mEq/L mEq/L    





   (3.3-5.0)   


 


POC Chloride    103 mEq/L mEq/L  





    ()  


 


Chloride  105 mEq/L mEq/L    





   ()   


 


Carbon Dioxide  19 mEq/l L mEq/l    





   (22-31)   


 


Anion Gap  18 mEq/L H mEq/L    





   (8-16)   


 


POC BUN    9 mg/dL mg/dL  





    (7-23)  


 


BUN  10 mg/dL mg/dL    





   (7-23)   


 


Creatinine  0.5 mg/dL L mg/dL    





   (0.7-1.3)   


 


POC Creatinine    0.6 mg/dL L mg/dL  





    (0.7-1.3)  


 


Estimated GFR  > 60     





    


 


Glucose  72 mg/dL mg/dL    





   ()   


 


POC Glucose    60 mg/dL L mg/dL  





    ()  


 


POC Lactic Acid Emeterio      3.7 mmol/L H D mmol/L





     (0.7-2.1) 


 


Calcium  7.8 mg/dL L mg/dL    





   (8.5-10.4)   


 


Magnesium      





    


 


Total Bilirubin      





    


 


Conjugated Bilirubin      





    


 


Unconjugated Bilirubin      





    


 


AST      





    


 


ALT      





    


 


Alkaline Phosphatase      





    


 


POC Troponin I      





    


 


Total Protein      





    


 


Albumin      





    


 


Lipase      





    


 


Urine Color      





    


 


Urine Appearance      





    


 


Urine pH      





    


 


Ur Specific Gravity      





    


 


Urine Protein      





    


 


Urine Ketones      





    


 


Urine Blood      





    


 


Urine Nitrate      





    


 


Urine Bilirubin      





    


 


Urine Urobilinogen      





    


 


Ur Leukocyte Esterase      





    


 


Urine Glucose      





    


 


Ethyl Alcohol      





    














  06/07/18 06/07/18 06/07/18





  05:17 04:24 02:00


 


WBC      





    


 


RBC      





    


 


Hgb      





    


 


POC Hgb      





    


 


Hct      





    


 


POC Hct      





    


 


MCV      





    


 


MCH      





    


 


MCHC      





    


 


RDW      





    


 


Plt Count      





    


 


MPV      





    


 


Neut % (Auto)      





    


 


Lymph % (Auto)      





    


 


Mono % (Auto)      





    


 


Eos % (Auto)      





    


 


Baso % (Auto)      





    


 


Nucleat RBC Rel Count      





    


 


Absolute Neuts (auto)      





    


 


Absolute Lymphs (auto)      





    


 


Absolute Monos (auto)      





    


 


Absolute Eos (auto)      





    


 


Absolute Basos (auto)      





    


 


Absolute Nucleated RBC      





    


 


Immature Gran %      





    


 


Immature Gran #      





    


 


POC Blood Source      





    


 


Patient Temperature      





    


 


POC VBG pH      





    


 


POC VBG pCO2      





    


 


POC VBG pO2      





    


 


POC VBG HCO3      





    


 


POC VBG Total CO2      





    


 


POC VBG Base Excess      





    


 


VBG Lactic Acid  3.8 mmol/L H mmol/L    





   (0.7-2.1)   


 


POC Mix VBG O2 Sat      





    


 


POC Sodium      





    


 


Sodium      





    


 


POC Potassium      





    


 


Potassium      





    


 


POC Chloride      





    


 


Chloride      





    


 


Carbon Dioxide      





    


 


Anion Gap      





    


 


POC BUN      





    


 


BUN      





    


 


Creatinine      





    


 


POC Creatinine      





    


 


Estimated GFR      





    


 


Glucose      





    


 


POC Glucose      





    


 


POC Lactic Acid Emeterio      





    


 


Calcium      





    


 


Magnesium      





    


 


Total Bilirubin      





    


 


Conjugated Bilirubin      





    


 


Unconjugated Bilirubin      





    


 


AST      





    


 


ALT      





    


 


Alkaline Phosphatase      





    


 


POC Troponin I      0.00 ng/mL ng/mL





     (0.00-0.08) 


 


Total Protein      





    


 


Albumin      





    


 


Lipase      





    


 


Urine Color    YELLOW   





    


 


Urine Appearance    CLEAR   





    


 


Urine pH    5.0   





    (5.0-7.5)  


 


Ur Specific Gravity    > 1.035  H   





    (1.002-1.030)  


 


Urine Protein    NEGATIVE   





    (NEGATIVE)  


 


Urine Ketones    1+  H   





    (NEGATIVE)  


 


Urine Blood    NEGATIVE   





    (NEGATIVE)  


 


Urine Nitrate    NEGATIVE   





    (NEGATIVE)  


 


Urine Bilirubin    NEGATIVE   





    (NEGATIVE)  


 


Urine Urobilinogen    4.0 EU H EU  





    (0.2-1.0)  


 


Ur Leukocyte Esterase    NEGATIVE   





    (NEGATIVE)  


 


Urine Glucose    NEGATIVE   





    (NEGATIVE)  


 


Ethyl Alcohol      





    














  06/07/18 06/07/18 06/07/18





  02:00 02:00 02:00


 


WBC      5.78 10^3/uL 10^3/uL





     (3.80-9.50) 


 


RBC      4.81 10^6/uL 10^6/uL





     (4.40-6.38) 


 


Hgb      15.5 g/dL g/dL





     (13.7-17.5) 


 


POC Hgb      





    


 


Hct      45.0 % %





     (40.0-51.0) 


 


POC Hct      





    


 


MCV      93.6 fL fL





     (81.5-99.8) 


 


MCH      32.2 pg pg





     (27.9-34.1) 


 


MCHC      34.4 g/dL g/dL





     (32.4-36.7) 


 


RDW      14.0 % %





     (11.5-15.2) 


 


Plt Count      157 10^3/uL 10^3/uL





     (150-400) 


 


MPV      10.1 fL fL





     (8.7-11.7) 


 


Neut % (Auto)      58.4 % %





     (39.3-74.2) 


 


Lymph % (Auto)      28.7 % %





     (15.0-45.0) 


 


Mono % (Auto)      10.7 % %





     (4.5-13.0) 


 


Eos % (Auto)      1.2 % %





     (0.6-7.6) 


 


Baso % (Auto)      0.7 % %





     (0.3-1.7) 


 


Nucleat RBC Rel Count      0.0 % %





     (0.0-0.2) 


 


Absolute Neuts (auto)      3.37 10^3/uL 10^3/uL





     (1.70-6.50) 


 


Absolute Lymphs (auto)      1.66 10^3/uL 10^3/uL





     (1.00-3.00) 


 


Absolute Monos (auto)      0.62 10^3/uL 10^3/uL





     (0.30-0.80) 


 


Absolute Eos (auto)      0.07 10^3/uL 10^3/uL





     (0.03-0.40) 


 


Absolute Basos (auto)      0.04 10^3/uL 10^3/uL





     (0.02-0.10) 


 


Absolute Nucleated RBC      0.00 10^3/uL 10^3/uL





     (0-0.01) 


 


Immature Gran %      0.3 % %





     (0.0-1.1) 


 


Immature Gran #      0.02 10^3/uL 10^3/uL





     (0.00-0.10) 


 


POC Blood Source  VENOUS     





    


 


Patient Temperature  37.0 DEGREES DEGREES    





    


 


POC VBG pH  7.54  H     





   (7.31-7.42)   


 


POC VBG pCO2  18 mmHg L mmHg    





   (40-44)   


 


POC VBG pO2  60 mmHg H mmHg    





   (35-40)   


 


POC VBG HCO3  15 mEq/L L mEq/L    





   (22-26)   


 


POC VBG Total CO2  15 mEq/L L mEq/L    





   (21-27)   


 


POC VBG Base Excess  -8.0 mEq/L L mEq/L    





   (-2.5-2.5)   


 


VBG Lactic Acid      





    


 


POC Mix VBG O2 Sat  94 % H %    





   (65-75)   


 


POC Sodium      





    


 


Sodium    145 mEq/L mEq/L  





    (135-145)  


 


POC Potassium      





    


 


Potassium    4.2 mEq/L mEq/L  





    (3.3-5.0)  


 


POC Chloride      





    


 


Chloride    105 mEq/L mEq/L  





    ()  


 


Carbon Dioxide    14 mEq/l L mEq/l  





    (22-31)  


 


Anion Gap    26 mEq/L H mEq/L  





    (8-16)  


 


POC BUN      





    


 


BUN    11 mg/dL mg/dL  





    (7-23)  


 


Creatinine    0.6 mg/dL L mg/dL  





    (0.7-1.3)  


 


POC Creatinine      





    


 


Estimated GFR    > 60   





    


 


Glucose    74 mg/dL mg/dL  





    ()  


 


POC Glucose      





    


 


POC Lactic Acid Emeterio  7.4 mmol/L H mmol/L    





   (0.7-2.1)   


 


Calcium    9.4 mg/dL mg/dL  





    (8.5-10.4)  


 


Magnesium    1.7 mg/dL mg/dL  





    (1.6-2.3)  


 


Total Bilirubin    1.4 mg/dL mg/dL  





    (0.1-1.4)  


 


Conjugated Bilirubin    0.8 mg/dL H mg/dL  





    (0.0-0.5)  


 


Unconjugated Bilirubin    0.6 mg/dL mg/dL  





    (0.0-1.1)  


 


AST    119 IU/L H IU/L  





    (17-59)  


 


ALT    101 IU/L H IU/L  





    (21-72)  


 


Alkaline Phosphatase    85 IU/L IU/L  





    ()  


 


POC Troponin I      





    


 


Total Protein    8.2 g/dL g/dL  





    (6.3-8.2)  


 


Albumin    4.9 g/dL g/dL  





    (3.5-5.0)  


 


Lipase    190 IU/L IU/L  





    ()  


 


Urine Color      





    


 


Urine Appearance      





    


 


Urine pH      





    


 


Ur Specific Gravity      





    


 


Urine Protein      





    


 


Urine Ketones      





    


 


Urine Blood      





    


 


Urine Nitrate      





    


 


Urine Bilirubin      





    


 


Urine Urobilinogen      





    


 


Ur Leukocyte Esterase      





    


 


Urine Glucose      





    


 


Ethyl Alcohol    190 mg/dL H mg/dL  





    (0-10)  











Medications Given: 


 








Discontinued Medications





Sodium Chloride (Ns)  1,000 mls @ 0 mls/hr IV EDNOW ONE; Wide Open


   PRN Reason: Protocol


   Stop: 06/07/18 01:39


   Last Admin: 06/07/18 01:55 Dose:  1,000 mls


Sodium Chloride (Ns)  1,000 mls @ 0 mls/hr IV EDNOW ONE; Wide Open


   PRN Reason: Protocol


   Stop: 06/07/18 02:13


   Last Admin: 06/07/18 02:15 Dose:  1,000 mls


Famotidine/Sodium Chloride (Pepcid 20 Mg (Premix))  50 mls @ 200 mls/hr IV 

EDNOW ONE


   Stop: 06/07/18 02:38


   Last Admin: 06/07/18 02:45 Dose:  50 mls


Lorazepam (Ativan Injection)  1 mg IVP EDNOW ONE


   Stop: 06/07/18 01:47


   Last Admin: 06/07/18 01:59 Dose:  1 mg


Ondansetron HCl (Zofran)  4 mg IVP EDNOW ONE


   Stop: 06/07/18 02:25


   Last Admin: 06/07/18 02:45 Dose:  4 mg





Point of Care Test Results: 


 Chemistry











  06/07/18 06/07/18





  05:28 02:00


 


POC Sodium  140 mEq/L mEq/L  





   (135-145)  


 


POC Potassium  4.1 mEq/L mEq/L  





   (3.3-5.0)  


 


POC Chloride  103 mEq/L mEq/L  





   ()  


 


POC BUN  9 mg/dL mg/dL  





   (7-23)  


 


POC Creatinine  0.6 mg/dL L mg/dL  





   (0.7-1.3)  


 


POC Glucose  60 mg/dL L mg/dL  





   ()  


 


POC Troponin I    0.00 ng/mL ng/mL





    (0.00-0.08) 








 Blood Gas/Lactic Acid-Arterial











  06/07/18





  02:00


 


POC Blood Source  VENOUS








 Blood Gas/Lactic Acid-Venous











  06/07/18 06/07/18





  05:22 02:00


 


POC VBG pH    7.54  H 





    (7.31-7.42) 


 


POC VBG pCO2    18 mmHg L mmHg





    (40-44) 


 


POC VBG pO2    60 mmHg H mmHg





    (35-40) 


 


POC VBG HCO3    15 mEq/L L mEq/L





    (22-26) 


 


POC VBG Total CO2    15 mEq/L L mEq/L





    (21-27) 


 


POC VBG Base Excess    -8.0 mEq/L L mEq/L





    (-2.5-2.5) 


 


POC Mix VBG O2 Sat    94 % H %





    (65-75) 


 


POC Lactic Acid Emeterio  3.7 mmol/L H D mmol/L  7.4 mmol/L H mmol/L





   (0.7-2.1)   (0.7-2.1) 








 ISTAT H&H











  06/07/18





  05:28


 


POC Hgb  13.9 gm/dL gm/dL





   (13.7-17.5) 


 


POC Hct  41 % %





   (40-51) 














Departure





- Departure


Disposition: Home, Routine, Self-Care


Clinical Impression: 


 Alcoholic ketoacidosis, Alcoholism





Chest pain


Qualifiers:


 Chest pain type: unspecified Qualified Code(s): R07.9 - Chest pain, unspecified





Alcoholic intoxication


Qualifiers:


 Complication of substance-induced condition: uncomplicated Qualified Code(s): 

F10.920 - Alcohol use, unspecified with intoxication, uncomplicated





Condition: Good


Instructions:  Dehydration (ED), Abuse of Alcohol (ED)


Additional Instructions: 


You need to stop drinking alcohol.  You are at risk of getting very ill because 

of your alcohol use.  Please make sure to drink plenty of fluids today, water 

or Gatorade.  You should eat bland foods until your stomach is feeling better.


Referrals: 


[facility],. [Clinic] - As per Instructions

## 2018-06-07 NOTE — CPEKG
Heart Rate: 139

RR Interval: 432

P-R Interval: 140

QRSD Interval: 84

QT Interval: 288

QTC Interval: 438

P Axis: 74

QRS Axis: 58

T Wave Axis: 60

EKG Severity - BORDERLINE ECG -

EKG Impression: SINUS TACHYCARDIA

Electronically Signed By: Seema Parra 07-Jun-2018 06:16:13

## 2018-06-09 ENCOUNTER — HOSPITAL ENCOUNTER (EMERGENCY)
Dept: HOSPITAL 80 - FED | Age: 68
Discharge: HOME | End: 2018-06-09
Payer: COMMERCIAL

## 2018-06-09 VITALS — SYSTOLIC BLOOD PRESSURE: 137 MMHG | DIASTOLIC BLOOD PRESSURE: 96 MMHG

## 2018-06-09 DIAGNOSIS — F10.20: Primary | ICD-10-CM

## 2018-06-09 DIAGNOSIS — F17.200: ICD-10-CM

## 2018-06-09 DIAGNOSIS — Z86.73: ICD-10-CM

## 2018-06-09 PROCEDURE — 99283 EMERGENCY DEPT VISIT LOW MDM: CPT

## 2018-06-09 NOTE — EDPHY
H & P


Stated Complaint: ETOH


Time Seen by Provider: 06/09/18 08:58


HPI/ROS: 





CHIEF COMPLAINT:  Vomiting, epigastric pain





HISTORY OF PRESENT ILLNESS:  68-year-old male history of alcoholism, familiar 

to emergency department staff, arrives by ambulance complaining of intractable 

vomiting, epigastric pain since early this morning.  Admits to continued heavy 

alcohol use.  No chest pain.  No dyspnea.  No back pain.  No radiation of 

abdominal pain.  No dizziness.  No suicidal or homicidal ideation.  No 

hematemesis.








REVIEW OF SYSTEMS:


A ten point review of systems was performed and is negative with the exception 

of the items mentioned in the HPI








PAST MEDICAL & SURGICAL  HISTORY:  Alcoholism.  Alcoholic pancreatitis.  

Alcoholic hepatitis.  Alcohol withdrawal.





SOCIAL HISTORY:  Admits to positive alcohol use.  Lives in an apartment by 

himself.














************


PHYSICAL EXAM





(Prior to examination, patient consented to physical exam, hands were washed 

and my usual and customary physical exam procedures followed)


1) GENERAL: Well-developed, well-nourished, alert and oriented.  Answering 

questions appropriately, making jokes, smiling


2) HEAD: Normocephalic, atraumatic


3) HEENT: Pupils equal, round, reactive to light bilaterally.  Sclera 

anicteric.  Nasopharynx, oropharynx, clear, no lesions.  Moist mucous membranes


4) NECK: Full range of motion, no meningeal signs.


5) LUNGS: Clear auscultation bilaterally, no wheezes, no rhonchi, no 

retractions.   


6) HEART: Regular rate and rhythm, no murmur, no heave, no gallop.


7) ABDOMEN: No guarding, tender to palpation epigastrium, no mass, no pulsatile 

mass, negative McBurney's, negative Clark's, negative Rovsing's, negative 

peritoneal sign,


8) MUSCULOSKELETAL:  Observed ambulating without assistance with stable steady 

gait.  Moving all extremities, no focal areas of tenderness, no obvious trauma.

  No peripheral edema or discoloration.


9) BACK: No CVA tenderness, no midline vertebral tenderness, no fluctuance, no 

step-off, no obvious trauma, no visual or palpable abnormality. 


10) SKIN: No rash, no petechiae. 


11) Psychiatric:  Patient is oriented X 3, there is no agitation.


12) NEURO: Awake, alert, and oriented to person, place and time.  Answers 

questions appropriately.  There were no obvious focal neurologic abnormalities.

  No cerebellar dysfunction.  Normal steady gait.  Upper and lower extremities 

bilaterally with strength 5 / 5, reflexes 2+.





***************





DIFFERENTIAL DIAGNOSIS:   In no particular order including but not limited to 

alcoholic pancreatitis, alcoholic hepatitis, alcoholic ketoacidosis, acute 

alcohol withdrawal,








- Personal History


Current Tetanus/Diphtheria Vaccine: Unsure


Current Tetanus Diphtheria and Acellular Pertussis (TDAP): Unsure





- Medical/Surgical History


Hx Asthma: No


Hx Chronic Respiratory Disease: Yes


Hx Diabetes: No


Hx Cardiac Disease: No


Hx Renal Disease: No


Hx Cirrhosis: No


Hx Alcoholism: Yes


Hx HIV/AIDS: No


Hx Splenectomy or Spleen Trauma: No


Other PMH: ETOH and opiate abuse, chronic back and shoulder pain, R rotator 

cuff surgery x 2 PTSD, CVA, emphysema. PNA





- Social History


Smoking Status: Current every day smoker


Constitutional: 


 Initial Vital Signs











Temperature (C)  36.7 C   06/09/18 09:00


 


Heart Rate  99   06/09/18 09:00


 


Respiratory Rate  18   06/09/18 09:00


 


Blood Pressure  162/121 H  06/09/18 09:00


 


O2 Sat (%)  99   06/09/18 09:00








 











O2 Delivery Mode               Room Air














Allergies/Adverse Reactions: 


 





No Known Allergies Allergy (Verified 05/27/18 20:17)


 








Home Medications: 














 Medication  Instructions  Recorded


 


Ondansetron Odt [Zofran Odt] 4 mg PO Q4PRN PRN #3 tab 06/09/18














Medical Decision Making


ED Course/Re-evaluation: 











9:55 a.m.:  Multiple emergency department staff members have attempted IV 

access unsuccessfully.  The patient now declines IV access or attempts at 

access..  He will be given oral Zofran oral Ativan.





10:08 a.m.:  Patient declines oral thiamine and folate.  He would like to 

leave.  He request GI cocktail and would like to leave.  I believe him to have 

decision-making capacity, is answering questions appropriately, alert oriented 

person place time events, stable steady gait, clear speech pattern.  I have 

offered to send [facility] to [facility] which he declines.  He has 

been informed that pathology such as acute pancreatitis and/or other pathology 

is not ruled out.  I saw this patient independently based on established 

practice protocols.  Care of patient under supervision of secondary supervising 

physician Dr Vaz .





- Data Points


Medications Given: 


 








Discontinued Medications





Al Hydroxide/Mg Hydroxide (Maalox Susp)  30 ml PO ONCE ONE


   Stop: 06/09/18 10:08


   Last Admin: 06/09/18 10:12 Dose:  30 ml


Folic Acid (Folic Acid)  1 mg PO EDNOW ONE


   Stop: 06/09/18 09:07


   Last Admin: 06/09/18 10:40 Dose:  Not Given


Hyoscyamine Sulfate (Levsin, Hyomax-Sl)  0.25 mg PO ONCE ONE


   Stop: 06/09/18 10:08


   Last Admin: 06/09/18 10:11 Dose:  0.25 mg


Lidocaine (Lidocaine 2% Viscous)  15 ml PO ONCE ONE


   Stop: 06/09/18 10:08


   Last Admin: 06/09/18 10:12 Dose:  15 ml


Lorazepam (Ativan Injection)  1 mg IVP EDNOW ONE


   Stop: 06/09/18 09:06


   Last Admin: 06/09/18 10:40 Dose:  Not Given


Lorazepam (Ativan)  1 mg PO EDNOW ONE


   Stop: 06/09/18 09:56


   Last Admin: 06/09/18 10:40 Dose:  Not Given


Ondansetron HCl (Zofran)  4 mg IVP EDNOW ONE


   Stop: 06/09/18 09:07


   Last Admin: 06/09/18 10:41 Dose:  Not Given


Ondansetron HCl (Zofran Odt)  4 mg PO EDNOW ONE


   Stop: 06/09/18 09:56


   Last Admin: 06/09/18 10:41 Dose:  Not Given


Thiamine HCl (Vitamin B-1)  100 mg PO EDNOW ONE


   Stop: 06/09/18 09:07


   Last Admin: 06/09/18 10:42 Dose:  Not Given








Departure





- Departure


Disposition: Home, Routine, Self-Care


Clinical Impression: 


 Alcoholism





Condition: Good


Instructions:  Abuse of Alcohol (ED)


Additional Instructions: 


Please consider long-term sobriety from alcohol.  You have declined laboratory 

studies from the emergency department.  If you change your mind return to the 

ER.


Referrals: 


[facility],. [Clinic] - 2-3 days, call for appt.


Prescriptions: 


Ondansetron Odt [Zofran Odt] 4 mg PO Q4PRN PRN #3 tab


 PRN Reason: Nausea

## 2018-06-15 ENCOUNTER — HOSPITAL ENCOUNTER (EMERGENCY)
Dept: HOSPITAL 80 - FED | Age: 68
Discharge: HOME | End: 2018-06-15
Payer: COMMERCIAL

## 2018-06-15 VITALS — DIASTOLIC BLOOD PRESSURE: 93 MMHG | SYSTOLIC BLOOD PRESSURE: 155 MMHG

## 2018-06-15 DIAGNOSIS — K29.20: Primary | ICD-10-CM

## 2018-06-15 DIAGNOSIS — F17.200: ICD-10-CM

## 2018-06-15 DIAGNOSIS — F10.229: ICD-10-CM

## 2018-06-15 LAB — PLATELET # BLD: 86 10^3/UL (ref 150–400)

## 2018-06-15 NOTE — ASMTCMCOM
CM Note

 

CM Note                       

Notes:

Pt contact made regarding outpatient services. Pt reported that [facility] but he 

would like to get his 's license back and that he can't engage in 

treatment. Pt. declined additional community referrals or resources including PCP follow up 

appointment. 

 

Date Signed:  06/15/2018 11:03 AM

Electronically Signed By:Mikhail Sanon LCSW

## 2018-06-15 NOTE — ASMTLACE
LACE

 

Length of stay for            Answers:  Less than 1 day                       

current admission                                                             

Acuity / Level of             Answers:  No                                    

Care: Did the patient                                                         

have an inpatient                                                             

admission?                                                                    

# of Emergency department     Answers:  12+                                   

visits in the last 6                                                          

months                                                                        

Social determinants           Answers:  History of substance                  

                                        abuse (ETOH, street                   

                                        drugs, prescription                   

                                        drugs, etc.)                          

Score: 9

 

Date Signed:  06/15/2018 11:05 AM

Electronically Signed By:Mikhail Sanon LCSW

## 2018-06-15 NOTE — EDPHY
H & P


Time Seen by Provider: 06/15/18 08:39


HPI/ROS: 





CHIEF COMPLAINT:  Abdominal pain and vomiting





HISTORY OF PRESENT ILLNESS:  Patient is a 68-year-old male well known to this 

emergency room with a history of alcoholism and pancreatitis.  He is here with 

a chief complaint of persistent nausea and vomiting and epigastric pain.  He 

has been seen previously many times for this and typically does quite well with 

Zofran and GI cocktail.  He reports that he continues to drink daily and drink 

this morning.  He has had no blood in his emesis or any dark stools.  Points to 

the epigastrium as the source of his pain.  Denies any chest pain or shortness 

of breath.





REVIEW OF SYSTEMS:


Constitutional:  No fever, no chills.


Eyes:  No discharge.


ENT:  No sore throat.


Cardiovascular:  No chest pain, no palpitations.


Respiratory:  No cough, no shortness of breath.


Gastrointestinal:  + abdominal pain, + vomiting.


Genitourinary:  No hematuria.


Musculoskeletal:  No back pain.


Skin:  No rashes.


Neurological:  No headache.


Smoking Status: Current every day smoker


Physical Exam: 





General Appearance:  Alert and no distress.  No active vomiting.  Non 

diaphoretic


Eyes:  Pupils equal and round no injection.


Respiratory:  Chest is nontender, lungs are clear to auscultation.


Cardiac:  regular rate and rhythm.


Gastrointestinal:  Abdomen is soft and mild epigastric tenderness without 

peritoneal signs.  no masses, bowel sounds normal.


Musculoskeletal:  Neck is supple and nontender.


Extremities have full range of motion and are nontender.


Skin:  No rashes or lesions.





Constitutional: 


 Initial Vital Signs











Temperature (C)  36.7 C   06/15/18 08:37


 


Heart Rate  95   06/15/18 08:37


 


Respiratory Rate  18   06/15/18 08:37


 


Blood Pressure  172/104 H  06/15/18 08:37


 


O2 Sat (%)  94   06/15/18 08:37








 











O2 Delivery Mode               Room Air














Allergies/Adverse Reactions: 


 





No Known Allergies Allergy (Verified 05/27/18 20:17)


 








Home Medications: 














 Medication  Instructions  Recorded


 


Ondansetron Odt [Zofran Odt] 4 mg PO Q4PRN PRN #3 tab 06/09/18


 


Ondansetron [Zofran Odt] 4 mg PO Q8HRS #8 tab.rapdis 06/15/18














Medical Decision Making


ED Course/Re-evaluation: 


68-year-old male here with history of alcoholic gastritis and pancreatitis.  He 

is currently still drinking.  Blood work shows no critical anemia or 

electrolyte disturbance.  He feels greatly improved after GI cocktail, Zofran, 

folate and thiamine.  Is given prescription for Zofran to control his nausea.  

We had a long discussion about alcohol cessation.


Differential Diagnosis: 


Bleeding gastric ulcer, coagulopathy, pancreatitis, ketoacidosis





- Data Points


Laboratory Results: 


 Laboratory Results





 06/15/18 09:04 





 06/15/18 09:04 





 











  06/15/18 06/15/18 06/15/18





  09:06 09:04 09:04


 


WBC    3.09 10^3/uL L 10^3/uL  





    (3.80-9.50)  


 


RBC    3.52 10^6/uL L 10^6/uL  





    (4.40-6.38)  


 


Hgb    11.6 g/dL L g/dL  





    (13.7-17.5)  


 


POC Hgb  15.0 gm/dL gm/dL    





   (13.7-17.5)   


 


Hct    34.7 % L %  





    (40.0-51.0)  


 


POC Hct  44 % %    





   (40-51)   


 


MCV    98.6 fL fL  





    (81.5-99.8)  


 


MCH    33.0 pg pg  





    (27.9-34.1)  


 


MCHC    33.4 g/dL g/dL  





    (32.4-36.7)  


 


RDW    14.6 % %  





    (11.5-15.2)  


 


Plt Count    86 10^3/uL L 10^3/uL  





    (150-400)  


 


POC Sodium  140 mEq/L mEq/L    





   (135-145)   


 


Sodium      143 mEq/L mEq/L





     (135-145) 


 


POC Potassium  3.8 mEq/L mEq/L    





   (3.3-5.0)   


 


Potassium      3.9 mEq/L mEq/L





     (3.3-5.0) 


 


POC Chloride  98 mEq/L mEq/L    





   ()   


 


Chloride      100 mEq/L mEq/L





     () 


 


Carbon Dioxide      23 mEq/l mEq/l





     (22-31) 


 


Anion Gap      20 mEq/L H mEq/L





     (8-16) 


 


POC BUN  6 mg/dL L mg/dL    





   (7-23)   


 


BUN      8 mg/dL mg/dL





     (7-23) 


 


Creatinine      0.5 mg/dL L mg/dL





     (0.7-1.3) 


 


POC Creatinine  0.7 mg/dL mg/dL    





   (0.7-1.3)   


 


Estimated GFR      > 60 





    


 


Glucose      83 mg/dL mg/dL





     () 


 


POC Glucose  89 mg/dL mg/dL    





   ()   


 


Calcium      8.6 mg/dL mg/dL





     (8.5-10.4) 


 


Total Bilirubin      0.9 mg/dL mg/dL





     (0.1-1.4) 


 


AST      266 IU/L H IU/L





     (17-59) 


 


ALT      142 IU/L H IU/L





     (21-72) 


 


Alkaline Phosphatase      71 IU/L IU/L





     () 


 


Total Protein      7.5 g/dL g/dL





     (6.3-8.2) 


 


Albumin      4.3 g/dL g/dL





     (3.5-5.0) 


 


Lipase      133 IU/L IU/L





     () 











Medications Given: 


 








Discontinued Medications





Al Hydroxide/Mg Hydroxide (Maalox Susp)  30 ml PO EDNOW ONE


   Stop: 06/15/18 09:18


   Last Admin: 06/15/18 09:21 Dose:  30 ml


Folic Acid (Folic Acid)  1 mg PO EDNOW ONE


   Stop: 06/15/18 09:09


   Last Admin: 06/15/18 09:20 Dose:  1 mg


Hyoscyamine Sulfate (Levsin, Hyomax-Sl)  0.125 mg PO EDNOW ONE


   Stop: 06/15/18 09:17


   Last Admin: 06/15/18 09:51 Dose:  0.125 mg


Lidocaine (Lidocaine 2% Viscous)  5 ml PO EDNOW ONE


   Stop: 06/15/18 09:17


   Last Admin: 06/15/18 09:20 Dose:  5 ml


Ondansetron HCl (Zofran Odt)  4 mg PO EDNOW ONE


   Stop: 06/15/18 08:40


   Last Admin: 06/15/18 08:41 Dose:  4 mg


Thiamine HCl (Vitamin B-1)  100 mg PO EDNOW ONE


   Stop: 06/15/18 09:10


   Last Admin: 06/15/18 09:20 Dose:  100 mg





Point of Care Test Results: 


 Chemistry











  06/15/18





  09:06


 


POC Sodium  140 mEq/L mEq/L





   (135-145) 


 


POC Potassium  3.8 mEq/L mEq/L





   (3.3-5.0) 


 


POC Chloride  98 mEq/L mEq/L





   () 


 


POC BUN  6 mg/dL L mg/dL





   (7-23) 


 


POC Creatinine  0.7 mg/dL mg/dL





   (0.7-1.3) 


 


POC Glucose  89 mg/dL mg/dL





   () 








 ISTAT H&H











  06/15/18





  09:06


 


POC Hgb  15.0 gm/dL gm/dL





   (13.7-17.5) 


 


POC Hct  44 % %





   (40-51) 














Departure





- Departure


Disposition: Home, Routine, Self-Care


Clinical Impression: 


 Alcoholic gastritis, Alcohol dependence, Acute alcohol intoxication





Condition: Good


Instructions:  Gastritis (ED)


Additional Instructions: 


Please follow-up with your primary care physician to discuss treatment for 

gastritis and alcoholism


Referrals: 


NONE *PRIMARY CARE P,. [Primary Care Provider] - As per Instructions


Prescriptions: 


Ondansetron [Zofran Odt] 4 mg PO Q8HRS #8 tab.katherinedis

## 2018-09-03 ENCOUNTER — HOSPITAL ENCOUNTER (EMERGENCY)
Dept: HOSPITAL 80 - FED | Age: 68
Discharge: HOME | End: 2018-09-03
Payer: COMMERCIAL

## 2018-09-03 VITALS — SYSTOLIC BLOOD PRESSURE: 147 MMHG | DIASTOLIC BLOOD PRESSURE: 95 MMHG
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
## 2018-09-03 DIAGNOSIS — F17.200: ICD-10-CM

## 2018-09-03 DIAGNOSIS — F10.920: ICD-10-CM

## 2018-09-03 DIAGNOSIS — S00.81XA: Primary | ICD-10-CM
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
## 2018-09-03 DIAGNOSIS — Z59.0: ICD-10-CM

## 2018-09-03 SDOH — ECONOMIC STABILITY - HOUSING INSECURITY: HOMELESSNESS: Z59.0
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
## 2018-09-11 ENCOUNTER — HOSPITAL ENCOUNTER (EMERGENCY)
Dept: HOSPITAL 80 - FED | Age: 68
LOS: 1 days | Discharge: HOME | End: 2018-09-12
Payer: COMMERCIAL

## 2018-09-11 DIAGNOSIS — E11.9: ICD-10-CM
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
## 2018-09-11 DIAGNOSIS — M25.512: Primary | ICD-10-CM

## 2018-09-11 DIAGNOSIS — F10.920: ICD-10-CM

## 2018-09-11 DIAGNOSIS — Z72.0: ICD-10-CM

## 2018-09-11 DIAGNOSIS — E86.9: ICD-10-CM

## 2018-09-11 LAB
INR PPP: 1.02 (ref 0.83–1.16)
PLATELET # BLD: 187 10^3/UL (ref 150–400)
PROTHROMBIN TIME: 13.6 SEC (ref 12–15)

## 2018-09-11 PROCEDURE — 70498 CT ANGIOGRAPHY NECK: CPT

## 2018-09-11 PROCEDURE — B32R1ZZ COMPUTERIZED TOMOGRAPHY (CT SCAN) OF INTRACRANIAL ARTERIES USING LOW OSMOLAR CONTRAST: ICD-10-PCS | Performed by: RADIOLOGY

## 2018-09-11 PROCEDURE — 70450 CT HEAD/BRAIN W/O DYE: CPT

## 2018-09-11 PROCEDURE — 96360 HYDRATION IV INFUSION INIT: CPT

## 2018-09-11 PROCEDURE — G0480 DRUG TEST DEF 1-7 CLASSES: HCPCS

## 2018-09-11 PROCEDURE — 99285 EMERGENCY DEPT VISIT HI MDM: CPT

## 2018-09-11 PROCEDURE — 70496 CT ANGIOGRAPHY HEAD: CPT

## 2018-09-11 PROCEDURE — 93005 ELECTROCARDIOGRAM TRACING: CPT

## 2018-09-11 PROCEDURE — 73030 X-RAY EXAM OF SHOULDER: CPT

## 2018-09-11 NOTE — CPEKG
Test Reason : OPEN

Blood Pressure : ***/*** mmHG

Vent. Rate : 115 BPM     Atrial Rate : 115 BPM

   P-R Int : 159 ms          QRS Dur : 090 ms

    QT Int : 321 ms       P-R-T Axes : 070 060 071 degrees

   QTc Int : 444 ms

 

Sinus tachycardia

 

Confirmed by Lester Vazquez ([id number]) on 9/11/2018 9:39:47 PM

 

Referred By:             Confirmed By:Lester Vazquez
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
## 2018-09-12 ENCOUNTER — HOSPITAL ENCOUNTER (EMERGENCY)
Dept: HOSPITAL 80 - FED | Age: 68
Discharge: HOME | End: 2018-09-12
Payer: COMMERCIAL

## 2018-09-12 VITALS — SYSTOLIC BLOOD PRESSURE: 134 MMHG | DIASTOLIC BLOOD PRESSURE: 70 MMHG

## 2018-09-12 VITALS — SYSTOLIC BLOOD PRESSURE: 128 MMHG | DIASTOLIC BLOOD PRESSURE: 88 MMHG

## 2018-09-12 DIAGNOSIS — F10.230: Primary | ICD-10-CM

## 2018-09-12 DIAGNOSIS — Z72.0: ICD-10-CM

## 2018-09-12 DIAGNOSIS — Z59.0: ICD-10-CM

## 2018-09-12 SDOH — ECONOMIC STABILITY - HOUSING INSECURITY: HOMELESSNESS: Z59.0

## 2018-09-12 NOTE — EDPHY
H & P


Stated Complaint: ETOH intox


Time Seen by Provider: 09/12/18 22:05


HPI/ROS: 





HPI





CHIEF COMPLAINT:  Need for Librium from [facility].





HISTORY OF PRESENT ILLNESS:  This is a 68-year-old male, well-known to the 

emergency room, alcoholic and homeless, he is currently at the [facility].  Sent here 

for Librium.  He reports he drinks daily.  Last drink hours ago.  Here in 

emergency room he appears well nontoxic no acute distress no evidence of 

significant withdrawal.





Past Medical History:  Alcoholism.





Past Surgical History:  No recent surgery





Social History:  Daily alcohol use.  Homeless





Family History:  Noncontributory.








ROS   


REVIEW OF SYSTEMS:


10 Systems were reviewed and negative with the exception of the elements 

mentioned in the history of present illness.








Exam   


Constitutional no evidence of withdrawal on exam, nontoxic,  triage nursing 

summary reviewed, vital signs reviewed, awake/alert. 


Eyes   normal conjunctivae and sclera, EOMI, PERRLA. 


HENT   normal inspection, atraumatic, moist mucus membranes, no epistaxis, neck 

supple/ no meningismus, no raccoon eyes. 


Respiratory   clear to auscultation bilaterally, normal breath sounds, no 

respiratory distress, no wheezing. 


Cardiovascular   rate normal, regular rhythm, no murmur, no edema, distal 

pulses normal. 


Gastrointestinal   soft, non-tender, no rebound, no guarding, normal bowel 

sounds, no distension, no pulsatile mass. 


Genitourinary   no CVA tenderness. 


Musculoskeletal  no midline vertebral tenderness, full range of motion, no calf 

swelling, no tenderness of extremities, no meningismus, good pulses, 

neurovascularly intact.


Skin   pink, warm, & dry, no rash, skin atraumatic. 


Neurologic   awake, alert and oriented x 3, AAOx3, moves all 4 extremities 

equally, motor intact, sensory intact, CN II-XII intact, normal cerebellar, 

normal vision, normal speech. 


Psychiatric   normal mood/affect. 


Heme/Lymph/Immune   no lymphadenopathy.





Differential Diagnosis:  Includes but is not limited to in a particular order 

alcohol draw, alcoholism, daily alcohol use, need for Librium





Medical Decision Making:  This patient appears well nontoxic in no acute 

distress and has no evidence of withdrawal here in the emergency room.  Will 

provide Librium 1st dose here in emergency room, Librium take-home pack.  

Return precautions.





Re-evaluation:





Source: Patient





- Medical/Surgical History


Hx Asthma: No


Hx Chronic Respiratory Disease: Yes


Hx Diabetes: No


Hx Cardiac Disease: No


Hx Renal Disease: No


Hx Cirrhosis: No


Hx Alcoholism: Yes


Hx HIV/AIDS: No


Hx Splenectomy or Spleen Trauma: No


Other PMH: ETOH and opiate abuse, chronic back and shoulder pain, R rotator 

cuff surgery x 2 PTSD, CVA, emphysema. PNA





- Social History


Smoking Status: Current every day smoker


Constitutional: 


 Initial Vital Signs











Temperature (C)  36.6 C   09/12/18 21:04


 


Heart Rate  107 H  09/12/18 21:04


 


Respiratory Rate  18   09/12/18 21:04


 


Blood Pressure  124/94 H  09/12/18 21:04


 


O2 Sat (%)  94   09/12/18 21:04








 











O2 Delivery Mode               Room Air














Allergies/Adverse Reactions: 


 





No Known Allergies Allergy (Verified 09/03/18 21:13)


 








Home Medications: 














 Medication  Instructions  Recorded


 


Ondansetron Odt [Zofran Odt] 4 mg PO Q4PRN PRN #3 tab 06/09/18


 


Gabapentin [Neurontin 300 MG (*)] 300 mg PO HS #9 cap 08/27/18


 


Oxycodone HCl  09/03/18


 


Temazepam  09/03/18


 


Valium  09/03/18


 


Xanax  09/03/18














Departure





- Departure


Disposition: Home, Routine, Self-Care


Clinical Impression: 


 Alcohol withdrawal





Condition: Good


Instructions:  Alcohol Intoxication (ED), Alcohol Withdrawal (ED), 

Chlordiazepoxide/Clidinium (By mouth)


Referrals: 


NONE *PRIMARY CARE P,. [Primary Care Provider] - As per Instructions

## 2019-02-21 ENCOUNTER — NEW PATIENT (OUTPATIENT)
Dept: URBAN - METROPOLITAN AREA CLINIC 10 | Facility: CLINIC | Age: 69
End: 2019-02-21
Payer: COMMERCIAL

## 2019-02-21 DIAGNOSIS — H25.813 COMBINED FORMS OF AGE-RELATED CATARACT, BILATERAL: Primary | ICD-10-CM

## 2019-02-21 DIAGNOSIS — H40.013 OPEN ANGLE WITH BORDERLINE FINDINGS, LOW RISK, BILATERAL: ICD-10-CM

## 2019-02-21 PROCEDURE — 92004 COMPRE OPH EXAM NEW PT 1/>: CPT | Performed by: OPTOMETRIST

## 2019-02-21 PROCEDURE — 92134 CPTRZ OPH DX IMG PST SGM RTA: CPT | Performed by: OPTOMETRIST

## 2019-02-21 ASSESSMENT — VISUAL ACUITY
OS: 20/70
OD: 20/HM

## 2019-02-21 ASSESSMENT — KERATOMETRY
OD: 41.00
OS: 41.13

## 2019-02-21 ASSESSMENT — INTRAOCULAR PRESSURE
OS: 21
OD: 19

## 2019-02-25 ENCOUNTER — Encounter (OUTPATIENT)
Dept: URBAN - METROPOLITAN AREA CLINIC 10 | Facility: CLINIC | Age: 69
End: 2019-02-25
Payer: MEDICARE

## 2019-02-25 ENCOUNTER — Encounter (OUTPATIENT)
Dept: URBAN - METROPOLITAN AREA CLINIC 10 | Facility: CLINIC | Age: 69
End: 2019-02-25
Payer: COMMERCIAL

## 2019-02-25 DIAGNOSIS — Z01.818 ENCOUNTER FOR OTHER PREPROCEDURAL EXAMINATION: Primary | ICD-10-CM

## 2019-02-25 PROCEDURE — 99213 OFFICE O/P EST LOW 20 MIN: CPT | Performed by: PHYSICIAN ASSISTANT

## 2019-02-26 ENCOUNTER — FOLLOW UP ESTABLISHED (OUTPATIENT)
Dept: URBAN - METROPOLITAN AREA CLINIC 10 | Facility: CLINIC | Age: 69
End: 2019-02-26
Payer: COMMERCIAL

## 2019-02-26 DIAGNOSIS — H52.213 IRREGULAR ASTIGMATISM, BILATERAL: ICD-10-CM

## 2019-02-26 PROCEDURE — 92012 INTRM OPH EXAM EST PATIENT: CPT | Performed by: OPHTHALMOLOGY

## 2019-02-26 ASSESSMENT — INTRAOCULAR PRESSURE
OS: 20
OD: 18

## 2019-03-05 ENCOUNTER — SURGERY (OUTPATIENT)
Dept: URBAN - METROPOLITAN AREA SURGERY 5 | Facility: SURGERY | Age: 69
End: 2019-03-05
Payer: MEDICARE

## 2019-03-05 PROCEDURE — 66982 XCAPSL CTRC RMVL CPLX WO ECP: CPT | Performed by: OPHTHALMOLOGY

## 2019-03-06 ENCOUNTER — POST OP (OUTPATIENT)
Dept: URBAN - METROPOLITAN AREA CLINIC 10 | Facility: CLINIC | Age: 69
End: 2019-03-06

## 2019-03-06 PROCEDURE — 99024 POSTOP FOLLOW-UP VISIT: CPT | Performed by: OPTOMETRIST

## 2019-03-06 ASSESSMENT — INTRAOCULAR PRESSURE: OD: 28

## 2019-04-09 ENCOUNTER — Encounter (OUTPATIENT)
Dept: URBAN - METROPOLITAN AREA CLINIC 10 | Facility: CLINIC | Age: 69
End: 2019-04-09
Payer: COMMERCIAL

## 2019-04-09 ENCOUNTER — POST OP (OUTPATIENT)
Dept: URBAN - METROPOLITAN AREA CLINIC 10 | Facility: CLINIC | Age: 69
End: 2019-04-09

## 2019-04-09 PROCEDURE — 99213 OFFICE O/P EST LOW 20 MIN: CPT | Performed by: PHYSICIAN ASSISTANT

## 2019-04-09 PROCEDURE — 99024 POSTOP FOLLOW-UP VISIT: CPT | Performed by: OPTOMETRIST

## 2019-04-09 ASSESSMENT — INTRAOCULAR PRESSURE
OD: 11
OS: 12
OD: 11
OS: 12

## 2019-04-09 ASSESSMENT — VISUAL ACUITY
OS: 20/70
OD: 20/20

## 2019-04-16 ENCOUNTER — SURGERY (OUTPATIENT)
Dept: URBAN - METROPOLITAN AREA SURGERY 5 | Facility: SURGERY | Age: 69
End: 2019-04-16
Payer: COMMERCIAL

## 2019-04-16 PROCEDURE — 66984 XCAPSL CTRC RMVL W/O ECP: CPT | Performed by: OPHTHALMOLOGY

## 2019-04-17 ENCOUNTER — POST OP (OUTPATIENT)
Dept: URBAN - METROPOLITAN AREA CLINIC 10 | Facility: CLINIC | Age: 69
End: 2019-04-17

## 2019-04-17 DIAGNOSIS — Z96.1 PRESENCE OF INTRAOCULAR LENS: Primary | ICD-10-CM

## 2019-04-17 PROCEDURE — 99024 POSTOP FOLLOW-UP VISIT: CPT | Performed by: OPTOMETRIST

## 2019-04-17 ASSESSMENT — INTRAOCULAR PRESSURE: OS: 16

## 2019-05-08 ENCOUNTER — POST OP (OUTPATIENT)
Dept: URBAN - METROPOLITAN AREA CLINIC 10 | Facility: CLINIC | Age: 69
End: 2019-05-08
Payer: COMMERCIAL

## 2019-05-08 DIAGNOSIS — H02.225 MECHANICAL LAGOPHTHALMOS LEFT LOWER EYELID: ICD-10-CM

## 2019-05-08 PROCEDURE — 99212 OFFICE O/P EST SF 10 MIN: CPT | Performed by: OPTOMETRIST

## 2019-05-08 PROCEDURE — 99024 POSTOP FOLLOW-UP VISIT: CPT | Performed by: OPTOMETRIST

## 2019-05-08 ASSESSMENT — VISUAL ACUITY
OS: 20/20
OD: 20/20

## 2019-05-08 ASSESSMENT — INTRAOCULAR PRESSURE
OS: 12
OD: 10

## 2019-08-21 ENCOUNTER — FOLLOW UP ESTABLISHED (OUTPATIENT)
Dept: URBAN - METROPOLITAN AREA CLINIC 10 | Facility: CLINIC | Age: 69
End: 2019-08-21
Payer: MEDICARE

## 2019-08-21 DIAGNOSIS — H02.422 MYOGENIC PTOSIS OF LEFT EYELID: Primary | ICD-10-CM

## 2019-08-21 PROCEDURE — 92285 EXTERNAL OCULAR PHOTOGRAPHY: CPT | Performed by: OPHTHALMOLOGY

## 2019-08-21 PROCEDURE — 99214 OFFICE O/P EST MOD 30 MIN: CPT | Performed by: OPHTHALMOLOGY

## 2025-03-02 NOTE — PDINTPN
Intensivist Progress Note


Assessment/Plan: 


Assessment:





Chronic alcohol abuse 





Alcohol withdrawal, DTs.  On the CIWA protocol.  On Precedex as well as 

scheduled and as needed Ativan.  Remains quite agitated and combative when 

sedation is lightened.





History of tobacco tobacco abuse.  Chronic bronchitis





Metabolic:   mild hyponatremia, hypomagnesemia.  On normal saline and 

replacement protocols.





DVT prophylaxis:  On enoxaparin.





GI prophylaxis:    On Pepcid.








Plan:  Continue care in the intensive care unit.  Continue Ativan scheduled and 

p.r.n..  Continue thiamin and CIWA protocol.  Decrease Precedex as possible.    

Continue nicotine patch.  Follow laboratory.  Continue intravenous fluids.  

Will check x-ray secondary to breath sound discrepancy.





25 minutes of critical care time spent directly with the patient.  Discussed 

with hospitalist, nursing, social work, and the ICU multi disciplinary team.





Subjective: 





 Sedated currently.  Arousable.  When lightened he is very combative, tries to 

hit and bite staff, curses, agitated


Objective: 





 Vital Signs











Temp Pulse Resp BP Pulse Ox


 


 37.1 C   61   20   128/65 H  100 


 


 09/19/17 07:42  09/19/17 09:34  09/19/17 09:34  09/19/17 09:34  09/19/17 09:34








 Laboratory Results





 09/19/17 02:20 





 09/19/17 02:20 





 











 09/18/17 09/19/17 09/20/17





 05:59 05:59 05:59


 


Intake Total 2910 2837 


 


Output Total 1605 2000 


 


Balance 1305 837 











 Laboratory Tests











  09/19/17





  02:20


 


Calcium  9.2


 


Phosphorus  3.5  D


 


Magnesium  1.5 L














Physical Exam





- Physical Exam


General Appearance: obtunded


EENT: PERRL/EOMI, other ( nasal cannula in place at 2 L), No scleral icterus (R)

, No scleral icterus (L)


Neck: normal inspection ( no JVD)


Respiratory: lungs clear ( anteriorly), decreased breath sounds (  Decreased 

breath sounds on right side more so than left.), rhonchi ( few with cough), 

other (  Coughed up some purulent looking mucus), No rales, No wheezing


Cardiac/Chest: regular rate, rhythm


Abdomen: normal bowel sounds, non-tender, soft


Skin: normal color, warm/dry


Extremities: No pedal edema


Neuro/Psych: no motor/sensory deficits ( moves all extremities equally), No 

cognition abnormalities





ICD10 Worksheet


Patient Problems: 


 Problems











Problem Status Onset


 


Acute alcohol intoxication Acute  


 


Alcohol withdrawal Acute  


 


Alcoholism Acute Previously Declined (within the last year)